# Patient Record
Sex: MALE | Race: WHITE | NOT HISPANIC OR LATINO | Employment: OTHER | URBAN - METROPOLITAN AREA
[De-identification: names, ages, dates, MRNs, and addresses within clinical notes are randomized per-mention and may not be internally consistent; named-entity substitution may affect disease eponyms.]

---

## 2022-08-19 ENCOUNTER — TELEPHONE (OUTPATIENT)
Dept: OPHTHALMOLOGY | Facility: CLINIC | Age: 87
End: 2022-08-19

## 2022-08-23 ENCOUNTER — TELEPHONE (OUTPATIENT)
Dept: OPHTHALMOLOGY | Facility: CLINIC | Age: 87
End: 2022-08-23

## 2022-08-23 NOTE — TELEPHONE ENCOUNTER
Pt recommended to by friend to see Dr. Chappell for macular degeneration exam    Ok per Dr. Chappell to schedule this Thursday

## 2022-08-25 ENCOUNTER — OFFICE VISIT (OUTPATIENT)
Dept: OPHTHALMOLOGY | Facility: CLINIC | Age: 87
End: 2022-08-25
Attending: OPHTHALMOLOGY
Payer: COMMERCIAL

## 2022-08-25 DIAGNOSIS — H35.3131 EARLY DRY STAGE NONEXUDATIVE AGE-RELATED MACULAR DEGENERATION OF BOTH EYES: ICD-10-CM

## 2022-08-25 DIAGNOSIS — H43.811 PVD (POSTERIOR VITREOUS DETACHMENT), RIGHT: ICD-10-CM

## 2022-08-25 DIAGNOSIS — H04.123 DRY EYE SYNDROME, BILATERAL: ICD-10-CM

## 2022-08-25 DIAGNOSIS — H43.812 PVD (POSTERIOR VITREOUS DETACHMENT), LEFT EYE: ICD-10-CM

## 2022-08-25 DIAGNOSIS — E11.9 TYPE 2 DIABETES MELLITUS WITHOUT COMPLICATION, WITH LONG-TERM CURRENT USE OF INSULIN (H): ICD-10-CM

## 2022-08-25 DIAGNOSIS — Z96.1 PSEUDOPHAKIA: ICD-10-CM

## 2022-08-25 DIAGNOSIS — Z79.4 TYPE 2 DIABETES MELLITUS WITHOUT COMPLICATION, WITH LONG-TERM CURRENT USE OF INSULIN (H): ICD-10-CM

## 2022-08-25 PROCEDURE — 92134 CPTRZ OPH DX IMG PST SGM RTA: CPT | Performed by: OPHTHALMOLOGY

## 2022-08-25 PROCEDURE — 99207 FUNDUS AUTOFLUORESCENCE IMAGE (FAF) OU (BOTH EYES): CPT | Mod: 26 | Performed by: OPHTHALMOLOGY

## 2022-08-25 PROCEDURE — G0463 HOSPITAL OUTPT CLINIC VISIT: HCPCS | Mod: 25

## 2022-08-25 PROCEDURE — 92250 FUNDUS PHOTOGRAPHY W/I&R: CPT | Performed by: OPHTHALMOLOGY

## 2022-08-25 PROCEDURE — 99204 OFFICE O/P NEW MOD 45 MIN: CPT | Mod: 25 | Performed by: OPHTHALMOLOGY

## 2022-08-25 PROCEDURE — 68761 CLOSE TEAR DUCT OPENING: CPT | Performed by: OPHTHALMOLOGY

## 2022-08-25 ASSESSMENT — REFRACTION_WEARINGRX
OS_CYLINDER: +0.50
OD_ADD: +2.75
SPECS_TYPE: BIFOCAL
OS_SPHERE: PLANO
OD_AXIS: 028
OD_SPHERE: -0.75
OD_CYLINDER: +1.00
OS_AXIS: 114
OS_ADD: +2.75

## 2022-08-25 ASSESSMENT — EXTERNAL EXAM - RIGHT EYE: OD_EXAM: NORMAL

## 2022-08-25 ASSESSMENT — VISUAL ACUITY
OD_PH_CC: 20/30
OS_PH_CC: 20/30
METHOD: SNELLEN - LINEAR
OD_CC: 20/40
OD_PH_CC+: -1
OS_PH_CC+: -1
CORRECTION_TYPE: GLASSES
OS_CC+: -2
OS_CC: 20/40

## 2022-08-25 ASSESSMENT — SLIT LAMP EXAM - LIDS
COMMENTS: NORMAL
COMMENTS: NORMAL

## 2022-08-25 ASSESSMENT — TONOMETRY
IOP_METHOD: TONOPEN
OS_IOP_MMHG: 21
OD_IOP_MMHG: 21

## 2022-08-25 ASSESSMENT — CONF VISUAL FIELD
OD_NORMAL: 1
METHOD: COUNTING FINGERS
OS_NORMAL: 1

## 2022-08-25 ASSESSMENT — EXTERNAL EXAM - LEFT EYE: OS_EXAM: NORMAL

## 2022-08-25 NOTE — NURSING NOTE
Chief Complaints and History of Present Illnesses   Patient presents with     Macular Degeneration Evaluation     Chief Complaint(s) and History of Present Illness(es)     Macular Degeneration Evaluation               Comments     Patient states that his vision varies. He states that he can see well without his glasses someday's and someday's he needs his glasses. He states that when he is reading the letter come together and it is hard to read. He states that he has some distortion. No flashes of light. No floaters. He states that when he was in [place] he was diagnosed with a retinal bleed in the right eye. He states that his eyes burn and they are drive. 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     Ocular Meds:systane 5-6 times a day in BE    Lio Do Freeman [facility], August 25, 2022 10:09 AM

## 2022-08-25 NOTE — PROGRESS NOTES
I have confirmed the patient's and reviewed Past Medical History, Past Surgical History, Social History, Family History, Problem List, Medication List and agree with Tech note.    CC: fluctuating vision both eyes     HPI: off and on for several montths     Assessment/plan:   1.  Diabetes mellitus no nonproliferative diabetic retinopathy     Blood glucose control   Last HbA1C is 6.8 in 4/2022   Monitor     2.  Posterior vitreous detachment (PVD) both eyes    Informed patient    Monitor     3.  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  Dry eye syndrome    Artificial tears over the counter    Monitor   Lower lids appear to have been cauterized   Silicone punctal plugs 0.6mm bilateral upper lids today    5.  Epiretinal membrane right eye    Informed patient    Monitor     RTC 8 months with Exam/OCT both eyes     Jovanna Mendez MD PhD.  Professor & Chair

## 2024-01-01 ENCOUNTER — APPOINTMENT (OUTPATIENT)
Dept: CT IMAGING | Facility: HOSPITAL | Age: 89
End: 2024-01-01
Attending: STUDENT IN AN ORGANIZED HEALTH CARE EDUCATION/TRAINING PROGRAM
Payer: COMMERCIAL

## 2024-01-01 ENCOUNTER — APPOINTMENT (OUTPATIENT)
Dept: GENERAL RADIOLOGY | Facility: CLINIC | Age: 89
DRG: 023 | End: 2024-01-01
Attending: NURSE PRACTITIONER
Payer: COMMERCIAL

## 2024-01-01 ENCOUNTER — APPOINTMENT (OUTPATIENT)
Dept: CARDIOLOGY | Facility: CLINIC | Age: 89
DRG: 023 | End: 2024-01-01
Attending: NURSE PRACTITIONER
Payer: COMMERCIAL

## 2024-01-01 ENCOUNTER — APPOINTMENT (OUTPATIENT)
Dept: INTERVENTIONAL RADIOLOGY/VASCULAR | Facility: CLINIC | Age: 89
DRG: 023 | End: 2024-01-01
Attending: INTERNAL MEDICINE
Payer: COMMERCIAL

## 2024-01-01 ENCOUNTER — APPOINTMENT (OUTPATIENT)
Dept: RADIOLOGY | Facility: HOSPITAL | Age: 89
End: 2024-01-01
Attending: EMERGENCY MEDICINE
Payer: COMMERCIAL

## 2024-01-01 ENCOUNTER — APPOINTMENT (OUTPATIENT)
Dept: GENERAL RADIOLOGY | Facility: CLINIC | Age: 89
DRG: 023 | End: 2024-01-01
Attending: PSYCHIATRY & NEUROLOGY
Payer: COMMERCIAL

## 2024-01-01 ENCOUNTER — HOSPITAL ENCOUNTER (INPATIENT)
Facility: CLINIC | Age: 89
LOS: 1 days | End: 2024-09-22
Attending: PSYCHIATRY & NEUROLOGY | Admitting: INTERNAL MEDICINE
Payer: COMMERCIAL

## 2024-01-01 ENCOUNTER — APPOINTMENT (OUTPATIENT)
Dept: MRI IMAGING | Facility: CLINIC | Age: 89
DRG: 023 | End: 2024-01-01
Attending: PSYCHIATRY & NEUROLOGY
Payer: COMMERCIAL

## 2024-01-01 ENCOUNTER — HOSPITAL ENCOUNTER (INPATIENT)
Facility: CLINIC | Age: 89
LOS: 2 days | Discharge: HOSPICE | DRG: 023 | End: 2024-09-21
Attending: PSYCHIATRY & NEUROLOGY | Admitting: PSYCHIATRY & NEUROLOGY
Payer: COMMERCIAL

## 2024-01-01 ENCOUNTER — APPOINTMENT (OUTPATIENT)
Dept: CT IMAGING | Facility: CLINIC | Age: 89
DRG: 023 | End: 2024-01-01
Attending: STUDENT IN AN ORGANIZED HEALTH CARE EDUCATION/TRAINING PROGRAM
Payer: COMMERCIAL

## 2024-01-01 ENCOUNTER — HOSPITAL ENCOUNTER (OUTPATIENT)
Age: 89
End: 2024-01-01
Attending: RADIOLOGY
Payer: COMMERCIAL

## 2024-01-01 ENCOUNTER — HOSPITAL ENCOUNTER (EMERGENCY)
Facility: HOSPITAL | Age: 89
Discharge: ANOTHER HOSPITAL | End: 2024-09-19
Attending: EMERGENCY MEDICINE | Admitting: EMERGENCY MEDICINE
Payer: COMMERCIAL

## 2024-01-01 VITALS
DIASTOLIC BLOOD PRESSURE: 51 MMHG | SYSTOLIC BLOOD PRESSURE: 88 MMHG | BODY MASS INDEX: 26.16 KG/M2 | HEART RATE: 97 BPM | OXYGEN SATURATION: 100 % | WEIGHT: 182.3 LBS | RESPIRATION RATE: 22 BRPM

## 2024-01-01 VITALS
DIASTOLIC BLOOD PRESSURE: 58 MMHG | SYSTOLIC BLOOD PRESSURE: 136 MMHG | HEIGHT: 70 IN | OXYGEN SATURATION: 93 % | WEIGHT: 175.93 LBS | TEMPERATURE: 99.5 F | RESPIRATION RATE: 17 BRPM | HEART RATE: 86 BPM | BODY MASS INDEX: 25.19 KG/M2

## 2024-01-01 VITALS — TEMPERATURE: 101 F

## 2024-01-01 DIAGNOSIS — R47.01 APHASIA: ICD-10-CM

## 2024-01-01 DIAGNOSIS — G81.91 RIGHT HEMIPLEGIA (H): ICD-10-CM

## 2024-01-01 DIAGNOSIS — I63.9 ACUTE CVA (CEREBROVASCULAR ACCIDENT) (H): ICD-10-CM

## 2024-01-01 LAB
ANION GAP SERPL CALCULATED.3IONS-SCNC: 10 MMOL/L (ref 7–15)
ANION GAP SERPL CALCULATED.3IONS-SCNC: 10 MMOL/L (ref 7–15)
ANION GAP SERPL CALCULATED.3IONS-SCNC: 12 MMOL/L (ref 7–15)
ANION GAP SERPL CALCULATED.3IONS-SCNC: 12 MMOL/L (ref 7–15)
APTT PPP: 26 SECONDS (ref 22–38)
APTT PPP: 31 SECONDS (ref 22–38)
BASE EXCESS BLDV CALC-SCNC: -1.8 MMOL/L (ref -3–3)
BASOPHILS # BLD AUTO: 0.1 10E3/UL (ref 0–0.2)
BASOPHILS NFR BLD AUTO: 1 %
BUN SERPL-MCNC: 14.8 MG/DL (ref 8–23)
BUN SERPL-MCNC: 15.5 MG/DL (ref 8–23)
BUN SERPL-MCNC: 20.1 MG/DL (ref 8–23)
BUN SERPL-MCNC: 21.7 MG/DL (ref 8–23)
CALCIUM SERPL-MCNC: 8.4 MG/DL (ref 8.8–10.4)
CALCIUM SERPL-MCNC: 8.4 MG/DL (ref 8.8–10.4)
CALCIUM SERPL-MCNC: 8.5 MG/DL (ref 8.8–10.4)
CALCIUM SERPL-MCNC: 9.1 MG/DL (ref 8.8–10.4)
CHLORIDE SERPL-SCNC: 100 MMOL/L (ref 98–107)
CHLORIDE SERPL-SCNC: 101 MMOL/L (ref 98–107)
CHLORIDE SERPL-SCNC: 103 MMOL/L (ref 98–107)
CHLORIDE SERPL-SCNC: 106 MMOL/L (ref 98–107)
CHOLEST SERPL-MCNC: 100 MG/DL
CREAT SERPL-MCNC: 0.91 MG/DL (ref 0.67–1.17)
CREAT SERPL-MCNC: 1.01 MG/DL (ref 0.67–1.17)
CREAT SERPL-MCNC: 1.01 MG/DL (ref 0.67–1.17)
CREAT SERPL-MCNC: 1.17 MG/DL (ref 0.67–1.17)
EGFRCR SERPLBLD CKD-EPI 2021: 60 ML/MIN/1.73M2
EGFRCR SERPLBLD CKD-EPI 2021: 72 ML/MIN/1.73M2
EGFRCR SERPLBLD CKD-EPI 2021: 72 ML/MIN/1.73M2
EGFRCR SERPLBLD CKD-EPI 2021: 81 ML/MIN/1.73M2
EOSINOPHIL # BLD AUTO: 0.3 10E3/UL (ref 0–0.7)
EOSINOPHIL NFR BLD AUTO: 4 %
ERYTHROCYTE [DISTWIDTH] IN BLOOD BY AUTOMATED COUNT: 16 % (ref 10–15)
ERYTHROCYTE [DISTWIDTH] IN BLOOD BY AUTOMATED COUNT: 16.3 % (ref 10–15)
ERYTHROCYTE [DISTWIDTH] IN BLOOD BY AUTOMATED COUNT: 16.5 % (ref 10–15)
EST. AVERAGE GLUCOSE BLD GHB EST-MCNC: 189 MG/DL
GLUCOSE BLDC GLUCOMTR-MCNC: 101 MG/DL (ref 70–99)
GLUCOSE BLDC GLUCOMTR-MCNC: 120 MG/DL (ref 70–99)
GLUCOSE BLDC GLUCOMTR-MCNC: 128 MG/DL (ref 70–99)
GLUCOSE BLDC GLUCOMTR-MCNC: 129 MG/DL (ref 70–99)
GLUCOSE BLDC GLUCOMTR-MCNC: 142 MG/DL (ref 70–99)
GLUCOSE BLDC GLUCOMTR-MCNC: 149 MG/DL (ref 70–99)
GLUCOSE BLDC GLUCOMTR-MCNC: 162 MG/DL (ref 70–99)
GLUCOSE BLDC GLUCOMTR-MCNC: 180 MG/DL (ref 70–99)
GLUCOSE BLDC GLUCOMTR-MCNC: 189 MG/DL (ref 70–99)
GLUCOSE BLDC GLUCOMTR-MCNC: 192 MG/DL (ref 70–99)
GLUCOSE BLDC GLUCOMTR-MCNC: 231 MG/DL (ref 70–99)
GLUCOSE BLDC GLUCOMTR-MCNC: 52 MG/DL (ref 70–99)
GLUCOSE BLDC GLUCOMTR-MCNC: 63 MG/DL (ref 70–99)
GLUCOSE BLDC GLUCOMTR-MCNC: 65 MG/DL (ref 70–99)
GLUCOSE BLDC GLUCOMTR-MCNC: 95 MG/DL (ref 70–99)
GLUCOSE SERPL-MCNC: 111 MG/DL (ref 70–99)
GLUCOSE SERPL-MCNC: 169 MG/DL (ref 70–99)
GLUCOSE SERPL-MCNC: 198 MG/DL (ref 70–99)
GLUCOSE SERPL-MCNC: 84 MG/DL (ref 70–99)
HBA1C MFR BLD: 8.2 %
HCO3 BLDV-SCNC: 24 MMOL/L (ref 21–28)
HCO3 SERPL-SCNC: 20 MMOL/L (ref 22–29)
HCO3 SERPL-SCNC: 20 MMOL/L (ref 22–29)
HCO3 SERPL-SCNC: 22 MMOL/L (ref 22–29)
HCO3 SERPL-SCNC: 23 MMOL/L (ref 22–29)
HCT VFR BLD AUTO: 30.3 % (ref 40–53)
HCT VFR BLD AUTO: 31.6 % (ref 40–53)
HCT VFR BLD AUTO: 31.7 % (ref 40–53)
HDLC SERPL-MCNC: 40 MG/DL
HGB BLD-MCNC: 10.1 G/DL (ref 13.3–17.7)
HGB BLD-MCNC: 9.5 G/DL (ref 13.3–17.7)
HGB BLD-MCNC: 9.9 G/DL (ref 13.3–17.7)
HOLD SPECIMEN: NORMAL
IMM GRANULOCYTES # BLD: 0 10E3/UL
IMM GRANULOCYTES NFR BLD: 1 %
INR PPP: 1.1 (ref 0.85–1.15)
INR PPP: 1.2 (ref 0.85–1.15)
LDLC SERPL CALC-MCNC: 38 MG/DL
LVEF ECHO: NORMAL
LYMPHOCYTES # BLD AUTO: 1.5 10E3/UL (ref 0.8–5.3)
LYMPHOCYTES NFR BLD AUTO: 23 %
MAGNESIUM SERPL-MCNC: 1.6 MG/DL (ref 1.7–2.3)
MAGNESIUM SERPL-MCNC: 2 MG/DL (ref 1.7–2.3)
MCH RBC QN AUTO: 23.7 PG (ref 26.5–33)
MCH RBC QN AUTO: 23.7 PG (ref 26.5–33)
MCH RBC QN AUTO: 23.9 PG (ref 26.5–33)
MCHC RBC AUTO-ENTMCNC: 31.3 G/DL (ref 31.5–36.5)
MCHC RBC AUTO-ENTMCNC: 31.4 G/DL (ref 31.5–36.5)
MCHC RBC AUTO-ENTMCNC: 31.9 G/DL (ref 31.5–36.5)
MCV RBC AUTO: 74 FL (ref 78–100)
MCV RBC AUTO: 76 FL (ref 78–100)
MCV RBC AUTO: 76 FL (ref 78–100)
MONOCYTES # BLD AUTO: 0.8 10E3/UL (ref 0–1.3)
MONOCYTES NFR BLD AUTO: 12 %
NEUTROPHILS # BLD AUTO: 3.9 10E3/UL (ref 1.6–8.3)
NEUTROPHILS NFR BLD AUTO: 60 %
NONHDLC SERPL-MCNC: 60 MG/DL
NRBC # BLD AUTO: 0 10E3/UL
NRBC BLD AUTO-RTO: 0 /100
O2/TOTAL GAS SETTING VFR VENT: 30 %
OXYHGB MFR BLDV: 65 % (ref 70–75)
PCO2 BLDV: 46 MM HG (ref 40–50)
PH BLDV: 7.33 [PH] (ref 7.32–7.43)
PHOSPHATE SERPL-MCNC: 3 MG/DL (ref 2.5–4.5)
PHOSPHATE SERPL-MCNC: 4 MG/DL (ref 2.5–4.5)
PLATELET # BLD AUTO: 296 10E3/UL (ref 150–450)
PLATELET # BLD AUTO: 298 10E3/UL (ref 150–450)
PLATELET # BLD AUTO: 316 10E3/UL (ref 150–450)
PO2 BLDV: 40 MM HG (ref 25–47)
POTASSIUM SERPL-SCNC: 4.2 MMOL/L (ref 3.4–5.3)
POTASSIUM SERPL-SCNC: 4.2 MMOL/L (ref 3.4–5.3)
POTASSIUM SERPL-SCNC: 4.3 MMOL/L (ref 3.4–5.3)
POTASSIUM SERPL-SCNC: 5.5 MMOL/L (ref 3.4–5.3)
RBC # BLD AUTO: 3.97 10E6/UL (ref 4.4–5.9)
RBC # BLD AUTO: 4.18 10E6/UL (ref 4.4–5.9)
RBC # BLD AUTO: 4.26 10E6/UL (ref 4.4–5.9)
SAO2 % BLDV: 66.7 % (ref 70–75)
SODIUM SERPL-SCNC: 133 MMOL/L (ref 135–145)
SODIUM SERPL-SCNC: 135 MMOL/L (ref 135–145)
SODIUM SERPL-SCNC: 135 MMOL/L (ref 135–145)
SODIUM SERPL-SCNC: 136 MMOL/L (ref 135–145)
TRIGL SERPL-MCNC: 112 MG/DL
TROPONIN T SERPL HS-MCNC: 20 NG/L
TROPONIN T SERPL HS-MCNC: 21 NG/L
TROPONIN T SERPL HS-MCNC: 25 NG/L
TSH SERPL DL<=0.005 MIU/L-ACNC: 1.04 UIU/ML (ref 0.3–4.2)
WBC # BLD AUTO: 10.4 10E3/UL (ref 4–11)
WBC # BLD AUTO: 10.8 10E3/UL (ref 4–11)
WBC # BLD AUTO: 6.4 10E3/UL (ref 4–11)

## 2024-01-01 PROCEDURE — 99291 CRITICAL CARE FIRST HOUR: CPT | Mod: FS | Performed by: NURSE PRACTITIONER

## 2024-01-01 PROCEDURE — 999N000185 HC STATISTIC TRANSPORT TIME EA 15 MIN

## 2024-01-01 PROCEDURE — 250N000011 HC RX IP 250 OP 636: Performed by: HOSPITALIST

## 2024-01-01 PROCEDURE — 84484 ASSAY OF TROPONIN QUANT: CPT | Performed by: NURSE PRACTITIONER

## 2024-01-01 PROCEDURE — 999N000157 HC STATISTIC RCP TIME EA 10 MIN

## 2024-01-01 PROCEDURE — 74018 RADEX ABDOMEN 1 VIEW: CPT

## 2024-01-01 PROCEDURE — 250N000013 HC RX MED GY IP 250 OP 250 PS 637: Performed by: NURSE PRACTITIONER

## 2024-01-01 PROCEDURE — 99222 1ST HOSP IP/OBS MODERATE 55: CPT | Performed by: PHYSICIAN ASSISTANT

## 2024-01-01 PROCEDURE — 272N000116 HC CATH CR1

## 2024-01-01 PROCEDURE — 250N000009 HC RX 250: Performed by: NURSE PRACTITIONER

## 2024-01-01 PROCEDURE — 99292 CRITICAL CARE ADDL 30 MIN: CPT | Mod: FS | Performed by: NURSE PRACTITIONER

## 2024-01-01 PROCEDURE — 5A1945Z RESPIRATORY VENTILATION, 24-96 CONSECUTIVE HOURS: ICD-10-PCS | Performed by: RADIOLOGY

## 2024-01-01 PROCEDURE — 272N000506 HC NEEDLE CR6

## 2024-01-01 PROCEDURE — 80048 BASIC METABOLIC PNL TOTAL CA: CPT | Performed by: NURSE PRACTITIONER

## 2024-01-01 PROCEDURE — 71045 X-RAY EXAM CHEST 1 VIEW: CPT

## 2024-01-01 PROCEDURE — 83735 ASSAY OF MAGNESIUM: CPT | Performed by: NURSE PRACTITIONER

## 2024-01-01 PROCEDURE — 03CG3Z7 EXTIRPATION OF MATTER FROM INTRACRANIAL ARTERY USING STENT RETRIEVER, PERCUTANEOUS APPROACH: ICD-10-PCS | Performed by: RADIOLOGY

## 2024-01-01 PROCEDURE — 250N000013 HC RX MED GY IP 250 OP 250 PS 637: Performed by: HOSPITALIST

## 2024-01-01 PROCEDURE — 255N000002 HC RX 255 OP 636: Performed by: INTERNAL MEDICINE

## 2024-01-01 PROCEDURE — 61645 PERQ ART M-THROMBECT &/NFS: CPT

## 2024-01-01 PROCEDURE — 87040 BLOOD CULTURE FOR BACTERIA: CPT | Performed by: PSYCHIATRY & NEUROLOGY

## 2024-01-01 PROCEDURE — 250N000011 HC RX IP 250 OP 636: Performed by: PEDIATRICS

## 2024-01-01 PROCEDURE — 74018 RADEX ABDOMEN 1 VIEW: CPT | Mod: 26 | Performed by: RADIOLOGY

## 2024-01-01 PROCEDURE — 99291 CRITICAL CARE FIRST HOUR: CPT | Mod: 25

## 2024-01-01 PROCEDURE — 61645 PERQ ART M-THROMBECT &/NFS: CPT | Mod: LT | Performed by: RADIOLOGY

## 2024-01-01 PROCEDURE — 70551 MRI BRAIN STEM W/O DYE: CPT | Mod: 26 | Performed by: RADIOLOGY

## 2024-01-01 PROCEDURE — 272N000190 HC ACCESSORY CR14

## 2024-01-01 PROCEDURE — 85610 PROTHROMBIN TIME: CPT | Performed by: NURSE PRACTITIONER

## 2024-01-01 PROCEDURE — 250N000011 HC RX IP 250 OP 636: Mod: JW | Performed by: NURSE PRACTITIONER

## 2024-01-01 PROCEDURE — 85610 PROTHROMBIN TIME: CPT | Performed by: EMERGENCY MEDICINE

## 2024-01-01 PROCEDURE — 250N000009 HC RX 250: Performed by: EMERGENCY MEDICINE

## 2024-01-01 PROCEDURE — 255N000002 HC RX 255 OP 636: Performed by: RADIOLOGY

## 2024-01-01 PROCEDURE — 200N000002 HC R&B ICU UMMC

## 2024-01-01 PROCEDURE — 272N000566 HC SHEATH CR3

## 2024-01-01 PROCEDURE — 250N000009 HC RX 250: Performed by: PHYSICIAN ASSISTANT

## 2024-01-01 PROCEDURE — 96375 TX/PRO/DX INJ NEW DRUG ADDON: CPT | Mod: 59

## 2024-01-01 PROCEDURE — 250N000011 HC RX IP 250 OP 636

## 2024-01-01 PROCEDURE — 250N000013 HC RX MED GY IP 250 OP 250 PS 637: Performed by: PSYCHIATRY & NEUROLOGY

## 2024-01-01 PROCEDURE — C1760 CLOSURE DEV, VASC: HCPCS

## 2024-01-01 PROCEDURE — 36415 COLL VENOUS BLD VENIPUNCTURE: CPT | Performed by: NURSE PRACTITIONER

## 2024-01-01 PROCEDURE — 70496 CT ANGIOGRAPHY HEAD: CPT

## 2024-01-01 PROCEDURE — 999N000065 XR CHEST PORT 1 VIEW

## 2024-01-01 PROCEDURE — 250N000011 HC RX IP 250 OP 636: Performed by: NURSE PRACTITIONER

## 2024-01-01 PROCEDURE — 85730 THROMBOPLASTIN TIME PARTIAL: CPT | Performed by: EMERGENCY MEDICINE

## 2024-01-01 PROCEDURE — 999N000128 HC STATISTIC PERIPHERAL IV START W/O US GUIDANCE

## 2024-01-01 PROCEDURE — C1757 CATH, THROMBECTOMY/EMBOLECT: HCPCS

## 2024-01-01 PROCEDURE — 82805 BLOOD GASES W/O2 SATURATION: CPT | Performed by: NURSE PRACTITIONER

## 2024-01-01 PROCEDURE — 94002 VENT MGMT INPAT INIT DAY: CPT

## 2024-01-01 PROCEDURE — 258N000001 HC RX 258: Performed by: NURSE PRACTITIONER

## 2024-01-01 PROCEDURE — 250N000011 HC RX IP 250 OP 636: Performed by: PHYSICIAN ASSISTANT

## 2024-01-01 PROCEDURE — 999N000065 XR ABDOMEN PORT 1 VIEW

## 2024-01-01 PROCEDURE — 99152 MOD SED SAME PHYS/QHP 5/>YRS: CPT

## 2024-01-01 PROCEDURE — 85730 THROMBOPLASTIN TIME PARTIAL: CPT | Performed by: NURSE PRACTITIONER

## 2024-01-01 PROCEDURE — 80048 BASIC METABOLIC PNL TOTAL CA: CPT | Performed by: EMERGENCY MEDICINE

## 2024-01-01 PROCEDURE — 99292 CRITICAL CARE ADDL 30 MIN: CPT

## 2024-01-01 PROCEDURE — 70450 CT HEAD/BRAIN W/O DYE: CPT | Mod: 26 | Performed by: RADIOLOGY

## 2024-01-01 PROCEDURE — 71045 X-RAY EXAM CHEST 1 VIEW: CPT | Mod: 26 | Performed by: RADIOLOGY

## 2024-01-01 PROCEDURE — 250N000011 HC RX IP 250 OP 636: Performed by: EMERGENCY MEDICINE

## 2024-01-01 PROCEDURE — 84100 ASSAY OF PHOSPHORUS: CPT | Performed by: NURSE PRACTITIONER

## 2024-01-01 PROCEDURE — 94003 VENT MGMT INPAT SUBQ DAY: CPT

## 2024-01-01 PROCEDURE — 85027 COMPLETE CBC AUTOMATED: CPT | Performed by: NURSE PRACTITIONER

## 2024-01-01 PROCEDURE — 258N000003 HC RX IP 258 OP 636

## 2024-01-01 PROCEDURE — 80061 LIPID PANEL: CPT | Performed by: NURSE PRACTITIONER

## 2024-01-01 PROCEDURE — 85025 COMPLETE CBC W/AUTO DIFF WBC: CPT | Performed by: EMERGENCY MEDICINE

## 2024-01-01 PROCEDURE — 258N000003 HC RX IP 258 OP 636: Performed by: INTERNAL MEDICINE

## 2024-01-01 PROCEDURE — C1887 CATHETER, GUIDING: HCPCS

## 2024-01-01 PROCEDURE — 70551 MRI BRAIN STEM W/O DYE: CPT

## 2024-01-01 PROCEDURE — 93005 ELECTROCARDIOGRAM TRACING: CPT | Performed by: EMERGENCY MEDICINE

## 2024-01-01 PROCEDURE — 250N000009 HC RX 250: Performed by: INTERNAL MEDICINE

## 2024-01-01 PROCEDURE — 250N000011 HC RX IP 250 OP 636: Performed by: INTERNAL MEDICINE

## 2024-01-01 PROCEDURE — G0508 CRIT CARE TELEHEA CONSULT 60: HCPCS | Mod: G0

## 2024-01-01 PROCEDURE — 83036 HEMOGLOBIN GLYCOSYLATED A1C: CPT | Performed by: NURSE PRACTITIONER

## 2024-01-01 PROCEDURE — 99223 1ST HOSP IP/OBS HIGH 75: CPT | Performed by: NURSE PRACTITIONER

## 2024-01-01 PROCEDURE — 110N000005 HC R&B HOSPICE, ACCENT

## 2024-01-01 PROCEDURE — 999N000208 ECHOCARDIOGRAM COMPLETE

## 2024-01-01 PROCEDURE — C1769 GUIDE WIRE: HCPCS

## 2024-01-01 PROCEDURE — 250N000012 HC RX MED GY IP 250 OP 636 PS 637: Performed by: NURSE PRACTITIONER

## 2024-01-01 PROCEDURE — 37195 THROMBOLYTIC THERAPY STROKE: CPT

## 2024-01-01 PROCEDURE — 36415 COLL VENOUS BLD VENIPUNCTURE: CPT | Performed by: PSYCHIATRY & NEUROLOGY

## 2024-01-01 PROCEDURE — 250N000009 HC RX 250: Performed by: PSYCHIATRY & NEUROLOGY

## 2024-01-01 PROCEDURE — 99222 1ST HOSP IP/OBS MODERATE 55: CPT | Performed by: INTERNAL MEDICINE

## 2024-01-01 PROCEDURE — 84443 ASSAY THYROID STIM HORMONE: CPT | Performed by: NURSE PRACTITIONER

## 2024-01-01 PROCEDURE — 76937 US GUIDE VASCULAR ACCESS: CPT

## 2024-01-01 PROCEDURE — 99239 HOSP IP/OBS DSCHRG MGMT >30: CPT | Performed by: HOSPITALIST

## 2024-01-01 PROCEDURE — 93306 TTE W/DOPPLER COMPLETE: CPT | Mod: 26 | Performed by: INTERNAL MEDICINE

## 2024-01-01 PROCEDURE — 84484 ASSAY OF TROPONIN QUANT: CPT | Performed by: EMERGENCY MEDICINE

## 2024-01-01 PROCEDURE — 250N000009 HC RX 250: Performed by: RADIOLOGY

## 2024-01-01 PROCEDURE — 250N000011 HC RX IP 250 OP 636: Performed by: PSYCHIATRY & NEUROLOGY

## 2024-01-01 PROCEDURE — 44500 INTRO GASTROINTESTINAL TUBE: CPT

## 2024-01-01 PROCEDURE — 999N000104 HC STATISTIC NO CHARGE

## 2024-01-01 PROCEDURE — 250N000013 HC RX MED GY IP 250 OP 250 PS 637

## 2024-01-01 PROCEDURE — 96365 THER/PROPH/DIAG IV INF INIT: CPT | Mod: 59

## 2024-01-01 PROCEDURE — 99239 HOSP IP/OBS DSCHRG MGMT >30: CPT | Mod: FS | Performed by: NURSE PRACTITIONER

## 2024-01-01 PROCEDURE — 36415 COLL VENOUS BLD VENIPUNCTURE: CPT | Performed by: EMERGENCY MEDICINE

## 2024-01-01 PROCEDURE — 999N000253 HC STATISTIC WEANING TRIALS

## 2024-01-01 PROCEDURE — 999N000259 HC STATISTIC EXTUBATION

## 2024-01-01 PROCEDURE — 31500 INSERT EMERGENCY AIRWAY: CPT

## 2024-01-01 PROCEDURE — 70450 CT HEAD/BRAIN W/O DYE: CPT

## 2024-01-01 RX ORDER — ACETAMINOPHEN 325 MG/1
650 TABLET ORAL EVERY 4 HOURS PRN
Status: DISCONTINUED | OUTPATIENT
Start: 2024-01-01 | End: 2024-01-01

## 2024-01-01 RX ORDER — MORPHINE SULFATE 10 MG/5ML
10 SOLUTION ORAL
Status: DISCONTINUED | OUTPATIENT
Start: 2024-01-01 | End: 2024-01-01

## 2024-01-01 RX ORDER — ETOMIDATE 2 MG/ML
20 INJECTION INTRAVENOUS ONCE
Status: COMPLETED | OUTPATIENT
Start: 2024-01-01 | End: 2024-01-01

## 2024-01-01 RX ORDER — MORPHINE SULFATE 2 MG/ML
2 INJECTION, SOLUTION INTRAMUSCULAR; INTRAVENOUS
Status: DISCONTINUED | OUTPATIENT
Start: 2024-01-01 | End: 2024-01-01 | Stop reason: HOSPADM

## 2024-01-01 RX ORDER — NOREPINEPHRINE BITARTRATE 0.06 MG/ML
.01-.6 INJECTION, SOLUTION INTRAVENOUS CONTINUOUS
Status: DISCONTINUED | OUTPATIENT
Start: 2024-01-01 | End: 2024-01-01

## 2024-01-01 RX ORDER — LORAZEPAM 1 MG/1
1 TABLET ORAL EVERY 6 HOURS
Status: CANCELLED | OUTPATIENT
Start: 2024-01-01

## 2024-01-01 RX ORDER — SENNOSIDES 8.6 MG
1 TABLET ORAL 2 TIMES DAILY PRN
Status: DISCONTINUED | OUTPATIENT
Start: 2024-01-01 | End: 2024-01-01

## 2024-01-01 RX ORDER — ONDANSETRON 2 MG/ML
4 INJECTION INTRAMUSCULAR; INTRAVENOUS EVERY 6 HOURS PRN
Status: CANCELLED | OUTPATIENT
Start: 2024-01-01

## 2024-01-01 RX ORDER — SODIUM CHLORIDE 9 MG/ML
INJECTION, SOLUTION INTRAVENOUS CONTINUOUS
Status: DISCONTINUED | OUTPATIENT
Start: 2024-01-01 | End: 2024-01-01 | Stop reason: HOSPADM

## 2024-01-01 RX ORDER — CARBOXYMETHYLCELLULOSE SODIUM 5 MG/ML
1-2 SOLUTION/ DROPS OPHTHALMIC
Status: DISCONTINUED | OUTPATIENT
Start: 2024-01-01 | End: 2024-01-01 | Stop reason: HOSPADM

## 2024-01-01 RX ORDER — MORPHINE SULFATE 10 MG/5ML
5 SOLUTION ORAL
Status: DISCONTINUED | OUTPATIENT
Start: 2024-01-01 | End: 2024-01-01

## 2024-01-01 RX ORDER — MINERAL OIL/HYDROPHIL PETROLAT
OINTMENT (GRAM) TOPICAL
Status: DISCONTINUED | OUTPATIENT
Start: 2024-01-01 | End: 2024-01-01

## 2024-01-01 RX ORDER — GLYCOPYRROLATE 0.2 MG/ML
0.4 INJECTION, SOLUTION INTRAMUSCULAR; INTRAVENOUS EVERY 4 HOURS
Status: DISCONTINUED | OUTPATIENT
Start: 2024-01-01 | End: 2024-01-01 | Stop reason: HOSPADM

## 2024-01-01 RX ORDER — NALOXONE HYDROCHLORIDE 0.4 MG/ML
0.1 INJECTION, SOLUTION INTRAMUSCULAR; INTRAVENOUS; SUBCUTANEOUS
Status: DISCONTINUED | OUTPATIENT
Start: 2024-01-01 | End: 2024-01-01

## 2024-01-01 RX ORDER — LORAZEPAM 1 MG/1
1 TABLET ORAL
Status: DISCONTINUED | OUTPATIENT
Start: 2024-01-01 | End: 2024-01-01

## 2024-01-01 RX ORDER — NALOXONE HYDROCHLORIDE 0.4 MG/ML
0.2 INJECTION, SOLUTION INTRAMUSCULAR; INTRAVENOUS; SUBCUTANEOUS
Status: DISCONTINUED | OUTPATIENT
Start: 2024-01-01 | End: 2024-01-01 | Stop reason: HOSPADM

## 2024-01-01 RX ORDER — FENTANYL CITRATE-0.9 % NACL/PF 10 MCG/ML
PLASTIC BAG, INJECTION (ML) INTRAVENOUS
Status: COMPLETED
Start: 2024-01-01 | End: 2024-01-01

## 2024-01-01 RX ORDER — ALLOPURINOL 100 MG/1
100 TABLET ORAL DAILY
Status: DISCONTINUED | OUTPATIENT
Start: 2024-01-01 | End: 2024-01-01

## 2024-01-01 RX ORDER — LORAZEPAM 2 MG/ML
1 INJECTION INTRAMUSCULAR EVERY 6 HOURS
Status: DISCONTINUED | OUTPATIENT
Start: 2024-01-01 | End: 2024-01-01 | Stop reason: HOSPADM

## 2024-01-01 RX ORDER — DEXTROSE MONOHYDRATE 100 MG/ML
INJECTION, SOLUTION INTRAVENOUS CONTINUOUS PRN
Status: DISCONTINUED | OUTPATIENT
Start: 2024-01-01 | End: 2024-01-01

## 2024-01-01 RX ORDER — ASPIRIN 81 MG/1
81 TABLET, CHEWABLE ORAL DAILY
Status: DISCONTINUED | OUTPATIENT
Start: 2024-01-01 | End: 2024-01-01

## 2024-01-01 RX ORDER — AMOXICILLIN 250 MG
1-2 CAPSULE ORAL 2 TIMES DAILY
Status: DISCONTINUED | OUTPATIENT
Start: 2024-01-01 | End: 2024-01-01

## 2024-01-01 RX ORDER — LABETALOL HYDROCHLORIDE 5 MG/ML
20 INJECTION, SOLUTION INTRAVENOUS ONCE
Status: DISCONTINUED | OUTPATIENT
Start: 2024-01-01 | End: 2024-01-01 | Stop reason: HOSPADM

## 2024-01-01 RX ORDER — MORPHINE SULFATE 2 MG/ML
2 INJECTION, SOLUTION INTRAMUSCULAR; INTRAVENOUS EVERY 4 HOURS
Status: DISCONTINUED | OUTPATIENT
Start: 2024-01-01 | End: 2024-01-01

## 2024-01-01 RX ORDER — LORAZEPAM 2 MG/ML
1 INJECTION INTRAMUSCULAR
Status: DISCONTINUED | OUTPATIENT
Start: 2024-01-01 | End: 2024-01-01

## 2024-01-01 RX ORDER — ASPIRIN 325 MG
325 TABLET, DELAYED RELEASE (ENTERIC COATED) ORAL DAILY
COMMUNITY

## 2024-01-01 RX ORDER — NALOXONE HYDROCHLORIDE 0.4 MG/ML
0.2 INJECTION, SOLUTION INTRAMUSCULAR; INTRAVENOUS; SUBCUTANEOUS
Status: DISCONTINUED | OUTPATIENT
Start: 2024-01-01 | End: 2024-01-01

## 2024-01-01 RX ORDER — MORPHINE SULFATE 2 MG/ML
1 INJECTION, SOLUTION INTRAMUSCULAR; INTRAVENOUS
Status: DISCONTINUED | OUTPATIENT
Start: 2024-01-01 | End: 2024-01-01 | Stop reason: HOSPADM

## 2024-01-01 RX ORDER — METFORMIN HCL 500 MG
500 TABLET, EXTENDED RELEASE 24 HR ORAL 2 TIMES DAILY WITH MEALS
COMMUNITY

## 2024-01-01 RX ORDER — LOPERAMIDE HCL 2 MG
2 CAPSULE ORAL DAILY PRN
COMMUNITY

## 2024-01-01 RX ORDER — LIDOCAINE 40 MG/G
CREAM TOPICAL
Status: DISCONTINUED | OUTPATIENT
Start: 2024-01-01 | End: 2024-01-01

## 2024-01-01 RX ORDER — ATROPINE SULFATE 0.1 MG/ML
INJECTION INTRAVENOUS
Status: DISPENSED
Start: 2024-01-01 | End: 2024-01-01

## 2024-01-01 RX ORDER — NALOXONE HYDROCHLORIDE 0.4 MG/ML
0.1 INJECTION, SOLUTION INTRAMUSCULAR; INTRAVENOUS; SUBCUTANEOUS
Status: DISCONTINUED | OUTPATIENT
Start: 2024-01-01 | End: 2024-01-01 | Stop reason: HOSPADM

## 2024-01-01 RX ORDER — LISINOPRIL 20 MG/1
20 TABLET ORAL DAILY
Status: DISCONTINUED | OUTPATIENT
Start: 2024-01-01 | End: 2024-01-01

## 2024-01-01 RX ORDER — MORPHINE SULFATE 2 MG/ML
2 INJECTION, SOLUTION INTRAMUSCULAR; INTRAVENOUS EVERY 4 HOURS
Status: CANCELLED | OUTPATIENT
Start: 2024-01-01

## 2024-01-01 RX ORDER — FENTANYL CITRATE 50 UG/ML
25-50 INJECTION, SOLUTION INTRAMUSCULAR; INTRAVENOUS EVERY 5 MIN PRN
Status: DISCONTINUED | OUTPATIENT
Start: 2024-01-01 | End: 2024-01-01 | Stop reason: HOSPADM

## 2024-01-01 RX ORDER — ATORVASTATIN CALCIUM 40 MG/1
40 TABLET, FILM COATED ORAL DAILY
COMMUNITY

## 2024-01-01 RX ORDER — LORAZEPAM 1 MG/1
1 TABLET ORAL EVERY 6 HOURS
Status: DISCONTINUED | OUTPATIENT
Start: 2024-01-01 | End: 2024-01-01 | Stop reason: HOSPADM

## 2024-01-01 RX ORDER — CARBOXYMETHYLCELLULOSE SODIUM 5 MG/ML
1-2 SOLUTION/ DROPS OPHTHALMIC
Status: CANCELLED | OUTPATIENT
Start: 2024-01-01

## 2024-01-01 RX ORDER — LIDOCAINE 40 MG/G
CREAM TOPICAL
Status: CANCELLED | OUTPATIENT
Start: 2024-01-01

## 2024-01-01 RX ORDER — SENNOSIDES 8.6 MG
1 TABLET ORAL 2 TIMES DAILY PRN
Status: CANCELLED | OUTPATIENT
Start: 2024-01-01

## 2024-01-01 RX ORDER — METOCLOPRAMIDE HYDROCHLORIDE 5 MG/ML
10 INJECTION INTRAMUSCULAR; INTRAVENOUS ONCE
Status: COMPLETED | OUTPATIENT
Start: 2024-01-01 | End: 2024-01-01

## 2024-01-01 RX ORDER — NALOXONE HYDROCHLORIDE 0.4 MG/ML
0.4 INJECTION, SOLUTION INTRAMUSCULAR; INTRAVENOUS; SUBCUTANEOUS
Status: DISCONTINUED | OUTPATIENT
Start: 2024-01-01 | End: 2024-01-01 | Stop reason: HOSPADM

## 2024-01-01 RX ORDER — ATROPINE SULFATE 10 MG/ML
2 SOLUTION/ DROPS OPHTHALMIC EVERY 4 HOURS PRN
Status: CANCELLED | OUTPATIENT
Start: 2024-01-01

## 2024-01-01 RX ORDER — NICOTINE POLACRILEX 4 MG
15-30 LOZENGE BUCCAL
Status: CANCELLED | OUTPATIENT
Start: 2024-01-01

## 2024-01-01 RX ORDER — MORPHINE SULFATE 2 MG/ML
2 INJECTION, SOLUTION INTRAMUSCULAR; INTRAVENOUS
Status: DISCONTINUED | OUTPATIENT
Start: 2024-01-01 | End: 2024-01-01

## 2024-01-01 RX ORDER — SODIUM CHLORIDE, SODIUM GLUCONATE, SODIUM ACETATE, POTASSIUM CHLORIDE AND MAGNESIUM CHLORIDE 526; 502; 368; 37; 30 MG/100ML; MG/100ML; MG/100ML; MG/100ML; MG/100ML
INJECTION, SOLUTION INTRAVENOUS CONTINUOUS
Status: DISCONTINUED | OUTPATIENT
Start: 2024-01-01 | End: 2024-01-01

## 2024-01-01 RX ORDER — POLYETHYLENE GLYCOL 3350 17 G/17G
17 POWDER, FOR SOLUTION ORAL DAILY
Status: CANCELLED | OUTPATIENT
Start: 2024-01-01

## 2024-01-01 RX ORDER — NALOXONE HYDROCHLORIDE 0.4 MG/ML
0.4 INJECTION, SOLUTION INTRAMUSCULAR; INTRAVENOUS; SUBCUTANEOUS
Status: DISCONTINUED | OUTPATIENT
Start: 2024-01-01 | End: 2024-01-01

## 2024-01-01 RX ORDER — IOPAMIDOL 755 MG/ML
67 INJECTION, SOLUTION INTRAVASCULAR ONCE
Status: COMPLETED | OUTPATIENT
Start: 2024-01-01 | End: 2024-01-01

## 2024-01-01 RX ORDER — DEXTROSE MONOHYDRATE 25 G/50ML
25-50 INJECTION, SOLUTION INTRAVENOUS
Status: DISCONTINUED | OUTPATIENT
Start: 2024-01-01 | End: 2024-01-01

## 2024-01-01 RX ORDER — SCOLOPAMINE TRANSDERMAL SYSTEM 1 MG/1
1 PATCH, EXTENDED RELEASE TRANSDERMAL
Status: DISCONTINUED | OUTPATIENT
Start: 2024-01-01 | End: 2024-01-01 | Stop reason: HOSPADM

## 2024-01-01 RX ORDER — PROPOFOL 10 MG/ML
5-75 INJECTION, EMULSION INTRAVENOUS CONTINUOUS
Status: DISCONTINUED | OUTPATIENT
Start: 2024-01-01 | End: 2024-01-01

## 2024-01-01 RX ORDER — LORAZEPAM 0.5 MG/1
1 TABLET ORAL EVERY 6 HOURS
Status: DISCONTINUED | OUTPATIENT
Start: 2024-01-01 | End: 2024-01-01

## 2024-01-01 RX ORDER — GLIPIZIDE 10 MG/1
1-2 TABLET ORAL
Status: DISCONTINUED | OUTPATIENT
Start: 2024-01-01 | End: 2024-01-01

## 2024-01-01 RX ORDER — PROPOFOL 10 MG/ML
5-75 INJECTION, EMULSION INTRAVENOUS CONTINUOUS
Status: DISCONTINUED | OUTPATIENT
Start: 2024-01-01 | End: 2024-01-01 | Stop reason: HOSPADM

## 2024-01-01 RX ORDER — FLUMAZENIL 0.1 MG/ML
0.2 INJECTION, SOLUTION INTRAVENOUS
Status: DISCONTINUED | OUTPATIENT
Start: 2024-01-01 | End: 2024-01-01 | Stop reason: HOSPADM

## 2024-01-01 RX ORDER — ATROPINE SULFATE 10 MG/ML
2 SOLUTION/ DROPS OPHTHALMIC EVERY 4 HOURS PRN
Status: DISCONTINUED | OUTPATIENT
Start: 2024-01-01 | End: 2024-01-01 | Stop reason: HOSPADM

## 2024-01-01 RX ORDER — LIDOCAINE 40 MG/G
CREAM TOPICAL
Status: DISCONTINUED | OUTPATIENT
Start: 2024-01-01 | End: 2024-01-01 | Stop reason: HOSPADM

## 2024-01-01 RX ORDER — PROPOFOL 10 MG/ML
5-45 INJECTION, EMULSION INTRAVENOUS CONTINUOUS
Status: DISCONTINUED | OUTPATIENT
Start: 2024-01-01 | End: 2024-01-01

## 2024-01-01 RX ORDER — GLYCOPYRROLATE 0.2 MG/ML
0.4 INJECTION, SOLUTION INTRAMUSCULAR; INTRAVENOUS EVERY 4 HOURS
Status: DISCONTINUED | OUTPATIENT
Start: 2024-01-01 | End: 2024-01-01

## 2024-01-01 RX ORDER — PROPOFOL 10 MG/ML
5-30 INJECTION, EMULSION INTRAVENOUS CONTINUOUS
Status: DISCONTINUED | OUTPATIENT
Start: 2024-01-01 | End: 2024-01-01

## 2024-01-01 RX ORDER — NOREPINEPHRINE BITARTRATE 0.02 MG/ML
.01-.6 INJECTION, SOLUTION INTRAVENOUS CONTINUOUS PRN
Status: DISCONTINUED | OUTPATIENT
Start: 2024-01-01 | End: 2024-01-01

## 2024-01-01 RX ORDER — ONDANSETRON 2 MG/ML
4 INJECTION INTRAMUSCULAR; INTRAVENOUS EVERY 6 HOURS PRN
Status: DISCONTINUED | OUTPATIENT
Start: 2024-01-01 | End: 2024-01-01

## 2024-01-01 RX ORDER — HYDRALAZINE HYDROCHLORIDE 20 MG/ML
10-20 INJECTION INTRAMUSCULAR; INTRAVENOUS EVERY 30 MIN PRN
Status: DISCONTINUED | OUTPATIENT
Start: 2024-01-01 | End: 2024-01-01

## 2024-01-01 RX ORDER — SODIUM CHLORIDE 9 MG/ML
INJECTION, SOLUTION INTRAVENOUS CONTINUOUS PRN
Status: DISCONTINUED | OUTPATIENT
Start: 2024-01-01 | End: 2024-01-01 | Stop reason: HOSPADM

## 2024-01-01 RX ORDER — HYDRALAZINE HYDROCHLORIDE 20 MG/ML
10 INJECTION INTRAMUSCULAR; INTRAVENOUS EVERY 10 MIN PRN
Status: DISCONTINUED | OUTPATIENT
Start: 2024-01-01 | End: 2024-01-01 | Stop reason: HOSPADM

## 2024-01-01 RX ORDER — NALOXONE HYDROCHLORIDE 0.4 MG/ML
0.1 INJECTION, SOLUTION INTRAMUSCULAR; INTRAVENOUS; SUBCUTANEOUS
Status: CANCELLED | OUTPATIENT
Start: 2024-01-01

## 2024-01-01 RX ORDER — FENTANYL CITRATE 50 UG/ML
25 INJECTION, SOLUTION INTRAMUSCULAR; INTRAVENOUS ONCE
Status: DISCONTINUED | OUTPATIENT
Start: 2024-01-01 | End: 2024-01-01

## 2024-01-01 RX ORDER — GLYCOPYRROLATE 1 MG/1
2 TABLET ORAL EVERY 4 HOURS PRN
Status: DISCONTINUED | OUTPATIENT
Start: 2024-01-01 | End: 2024-01-01

## 2024-01-01 RX ORDER — NALOXONE HYDROCHLORIDE 0.4 MG/ML
0.2 INJECTION, SOLUTION INTRAMUSCULAR; INTRAVENOUS; SUBCUTANEOUS
Status: CANCELLED | OUTPATIENT
Start: 2024-01-01

## 2024-01-01 RX ORDER — BISACODYL 10 MG
10 SUPPOSITORY, RECTAL RECTAL
Status: CANCELLED | OUTPATIENT
Start: 2024-09-24

## 2024-01-01 RX ORDER — NALOXONE HYDROCHLORIDE 0.4 MG/ML
0.4 INJECTION, SOLUTION INTRAMUSCULAR; INTRAVENOUS; SUBCUTANEOUS
Status: CANCELLED | OUTPATIENT
Start: 2024-01-01

## 2024-01-01 RX ORDER — AMOXICILLIN 250 MG
1-2 CAPSULE ORAL 2 TIMES DAILY
Status: CANCELLED | OUTPATIENT
Start: 2024-01-01

## 2024-01-01 RX ORDER — MORPHINE SULFATE 2 MG/ML
1-2 INJECTION, SOLUTION INTRAMUSCULAR; INTRAVENOUS
Status: DISCONTINUED | OUTPATIENT
Start: 2024-01-01 | End: 2024-01-01

## 2024-01-01 RX ORDER — SENNOSIDES 8.6 MG
1 TABLET ORAL 2 TIMES DAILY PRN
Status: DISCONTINUED | OUTPATIENT
Start: 2024-01-01 | End: 2024-01-01 | Stop reason: HOSPADM

## 2024-01-01 RX ORDER — GLYCOPYRROLATE 0.2 MG/ML
0.2 INJECTION, SOLUTION INTRAMUSCULAR; INTRAVENOUS EVERY 4 HOURS PRN
Status: DISCONTINUED | OUTPATIENT
Start: 2024-01-01 | End: 2024-01-01

## 2024-01-01 RX ORDER — NICOTINE POLACRILEX 4 MG
15-30 LOZENGE BUCCAL
Status: DISCONTINUED | OUTPATIENT
Start: 2024-01-01 | End: 2024-01-01

## 2024-01-01 RX ORDER — LABETALOL HYDROCHLORIDE 5 MG/ML
10 INJECTION, SOLUTION INTRAVENOUS EVERY 10 MIN PRN
Status: DISCONTINUED | OUTPATIENT
Start: 2024-01-01 | End: 2024-01-01 | Stop reason: HOSPADM

## 2024-01-01 RX ORDER — CEFAZOLIN SODIUM 2 G/100ML
2 INJECTION, SOLUTION INTRAVENOUS EVERY 8 HOURS
Status: DISCONTINUED | OUTPATIENT
Start: 2024-01-01 | End: 2024-01-01

## 2024-01-01 RX ORDER — MORPHINE SULFATE 2 MG/ML
1 INJECTION, SOLUTION INTRAMUSCULAR; INTRAVENOUS
Status: DISCONTINUED | OUTPATIENT
Start: 2024-01-01 | End: 2024-01-01

## 2024-01-01 RX ORDER — GABAPENTIN 100 MG/1
200 CAPSULE ORAL 2 TIMES DAILY
Status: DISCONTINUED | OUTPATIENT
Start: 2024-01-01 | End: 2024-01-01

## 2024-01-01 RX ORDER — FENTANYL CITRATE 50 UG/ML
100 INJECTION, SOLUTION INTRAMUSCULAR; INTRAVENOUS ONCE
Status: COMPLETED | OUTPATIENT
Start: 2024-01-01 | End: 2024-01-01

## 2024-01-01 RX ORDER — GLYCOPYRROLATE 0.2 MG/ML
0.4 INJECTION, SOLUTION INTRAMUSCULAR; INTRAVENOUS EVERY 4 HOURS
Status: CANCELLED | OUTPATIENT
Start: 2024-01-01

## 2024-01-01 RX ORDER — DEXTROSE MONOHYDRATE 25 G/50ML
25-50 INJECTION, SOLUTION INTRAVENOUS
Status: CANCELLED | OUTPATIENT
Start: 2024-01-01

## 2024-01-01 RX ORDER — BISACODYL 10 MG
10 SUPPOSITORY, RECTAL RECTAL
Status: DISCONTINUED | OUTPATIENT
Start: 2024-09-24 | End: 2024-01-01

## 2024-01-01 RX ORDER — LABETALOL HYDROCHLORIDE 5 MG/ML
10-20 INJECTION, SOLUTION INTRAVENOUS
Status: CANCELLED | OUTPATIENT
Start: 2024-01-01

## 2024-01-01 RX ORDER — HEPARIN SODIUM 200 [USP'U]/100ML
1 INJECTION, SOLUTION INTRAVENOUS CONTINUOUS PRN
Status: DISCONTINUED | OUTPATIENT
Start: 2024-01-01 | End: 2024-01-01 | Stop reason: HOSPADM

## 2024-01-01 RX ORDER — LABETALOL HYDROCHLORIDE 5 MG/ML
10-20 INJECTION, SOLUTION INTRAVENOUS
Status: DISCONTINUED | OUTPATIENT
Start: 2024-01-01 | End: 2024-01-01

## 2024-01-01 RX ORDER — GABAPENTIN 100 MG/1
200 CAPSULE ORAL 2 TIMES DAILY
COMMUNITY

## 2024-01-01 RX ORDER — MAGNESIUM SULFATE HEPTAHYDRATE 40 MG/ML
2 INJECTION, SOLUTION INTRAVENOUS ONCE
Status: COMPLETED | OUTPATIENT
Start: 2024-01-01 | End: 2024-01-01

## 2024-01-01 RX ORDER — HALOPERIDOL 5 MG/ML
2 INJECTION INTRAMUSCULAR ONCE
Status: COMPLETED | OUTPATIENT
Start: 2024-01-01 | End: 2024-01-01

## 2024-01-01 RX ORDER — DEXTROSE MONOHYDRATE AND SODIUM CHLORIDE 5; .9 G/100ML; G/100ML
INJECTION, SOLUTION INTRAVENOUS CONTINUOUS
Status: DISCONTINUED | OUTPATIENT
Start: 2024-01-01 | End: 2024-01-01

## 2024-01-01 RX ORDER — BISACODYL 10 MG
10 SUPPOSITORY, RECTAL RECTAL
Status: DISCONTINUED | OUTPATIENT
Start: 2024-09-24 | End: 2024-01-01 | Stop reason: HOSPADM

## 2024-01-01 RX ORDER — LORAZEPAM 2 MG/ML
1 INJECTION INTRAMUSCULAR EVERY 6 HOURS
Status: CANCELLED | OUTPATIENT
Start: 2024-01-01

## 2024-01-01 RX ORDER — FENTANYL CITRATE 50 UG/ML
25-50 INJECTION, SOLUTION INTRAMUSCULAR; INTRAVENOUS EVERY 5 MIN PRN
Status: CANCELLED | OUTPATIENT
Start: 2024-01-01

## 2024-01-01 RX ORDER — ENOXAPARIN SODIUM 100 MG/ML
40 INJECTION SUBCUTANEOUS EVERY 24 HOURS
Status: DISCONTINUED | OUTPATIENT
Start: 2024-01-01 | End: 2024-01-01

## 2024-01-01 RX ORDER — ACETAMINOPHEN 650 MG/1
650 SUPPOSITORY RECTAL EVERY 4 HOURS PRN
Status: DISCONTINUED | OUTPATIENT
Start: 2024-01-01 | End: 2024-01-01

## 2024-01-01 RX ORDER — HEPARIN SODIUM 200 [USP'U]/100ML
1 INJECTION, SOLUTION INTRAVENOUS EVERY 5 MIN PRN
Status: CANCELLED | OUTPATIENT
Start: 2024-01-01

## 2024-01-01 RX ORDER — GUAIFENESIN 600 MG/1
15 TABLET, EXTENDED RELEASE ORAL DAILY
Status: DISCONTINUED | OUTPATIENT
Start: 2024-01-01 | End: 2024-01-01

## 2024-01-01 RX ORDER — ACETAMINOPHEN 650 MG/1
650 SUPPOSITORY RECTAL EVERY 6 HOURS PRN
Status: DISCONTINUED | OUTPATIENT
Start: 2024-01-01 | End: 2024-01-01 | Stop reason: HOSPADM

## 2024-01-01 RX ORDER — FLUMAZENIL 0.1 MG/ML
0.2 INJECTION, SOLUTION INTRAVENOUS
Status: CANCELLED | OUTPATIENT
Start: 2024-01-01

## 2024-01-01 RX ORDER — SODIUM CHLORIDE, SODIUM GLUCONATE, SODIUM ACETATE, POTASSIUM CHLORIDE AND MAGNESIUM CHLORIDE 526; 502; 368; 37; 30 MG/100ML; MG/100ML; MG/100ML; MG/100ML; MG/100ML
INJECTION, SOLUTION INTRAVENOUS CONTINUOUS
Status: CANCELLED | OUTPATIENT
Start: 2024-01-01

## 2024-01-01 RX ORDER — ACETAMINOPHEN 650 MG/1
650 SUPPOSITORY RECTAL EVERY 4 HOURS PRN
Status: DISCONTINUED | OUTPATIENT
Start: 2024-01-01 | End: 2024-01-01 | Stop reason: HOSPADM

## 2024-01-01 RX ORDER — HYDRALAZINE HYDROCHLORIDE 20 MG/ML
10-20 INJECTION INTRAMUSCULAR; INTRAVENOUS EVERY 30 MIN PRN
Status: CANCELLED | OUTPATIENT
Start: 2024-01-01

## 2024-01-01 RX ORDER — SODIUM CHLORIDE 9 MG/ML
INJECTION, SOLUTION INTRAVENOUS CONTINUOUS
Status: CANCELLED | OUTPATIENT
Start: 2024-01-01

## 2024-01-01 RX ORDER — ACETAMINOPHEN 325 MG/1
650 TABLET ORAL EVERY 4 HOURS PRN
Status: CANCELLED | OUTPATIENT
Start: 2024-01-01

## 2024-01-01 RX ORDER — MINERAL OIL/HYDROPHIL PETROLAT
OINTMENT (GRAM) TOPICAL
Status: DISCONTINUED | OUTPATIENT
Start: 2024-01-01 | End: 2024-01-01 | Stop reason: HOSPADM

## 2024-01-01 RX ORDER — MORPHINE SULFATE 2 MG/ML
2 INJECTION, SOLUTION INTRAMUSCULAR; INTRAVENOUS EVERY 4 HOURS
Status: DISCONTINUED | OUTPATIENT
Start: 2024-01-01 | End: 2024-01-01 | Stop reason: HOSPADM

## 2024-01-01 RX ORDER — LIDOCAINE HYDROCHLORIDE 20 MG/ML
SOLUTION OROPHARYNGEAL
Status: COMPLETED
Start: 2024-01-01 | End: 2024-01-01

## 2024-01-01 RX ORDER — INSULIN LISPRO 100 [IU]/ML
0-6 INJECTION, SOLUTION INTRAVENOUS; SUBCUTANEOUS AT BEDTIME
COMMUNITY

## 2024-01-01 RX ORDER — LIDOCAINE HYDROCHLORIDE 20 MG/ML
JELLY TOPICAL ONCE
Status: COMPLETED | OUTPATIENT
Start: 2024-01-01 | End: 2024-01-01

## 2024-01-01 RX ORDER — MORPHINE SULFATE 4 MG/ML
2 INJECTION, SOLUTION INTRAMUSCULAR; INTRAVENOUS EVERY 4 HOURS
Status: DISCONTINUED | OUTPATIENT
Start: 2024-01-01 | End: 2024-01-01

## 2024-01-01 RX ORDER — MAGNESIUM OXIDE 400 MG/1
400 TABLET ORAL EVERY 4 HOURS
Status: DISCONTINUED | OUTPATIENT
Start: 2024-01-01 | End: 2024-01-01

## 2024-01-01 RX ORDER — ACETAMINOPHEN 650 MG/1
650 SUPPOSITORY RECTAL EVERY 4 HOURS PRN
Status: CANCELLED | OUTPATIENT
Start: 2024-01-01

## 2024-01-01 RX ORDER — LIDOCAINE HYDROCHLORIDE 20 MG/ML
5 SOLUTION OROPHARYNGEAL ONCE
Status: COMPLETED | OUTPATIENT
Start: 2024-01-01 | End: 2024-01-01

## 2024-01-01 RX ORDER — HEPARIN SODIUM 200 [USP'U]/100ML
1 INJECTION, SOLUTION INTRAVENOUS EVERY 5 MIN PRN
Status: DISCONTINUED | OUTPATIENT
Start: 2024-01-01 | End: 2024-01-01 | Stop reason: HOSPADM

## 2024-01-01 RX ORDER — ATROPINE SULFATE 10 MG/ML
2 SOLUTION/ DROPS OPHTHALMIC EVERY 4 HOURS PRN
Status: DISCONTINUED | OUTPATIENT
Start: 2024-01-01 | End: 2024-01-01

## 2024-01-01 RX ORDER — IODIXANOL 320 MG/ML
150 INJECTION, SOLUTION INTRAVASCULAR ONCE
Status: COMPLETED | OUTPATIENT
Start: 2024-01-01 | End: 2024-01-01

## 2024-01-01 RX ORDER — HEPARIN SODIUM 200 [USP'U]/100ML
1 INJECTION, SOLUTION INTRAVENOUS CONTINUOUS PRN
Status: CANCELLED | OUTPATIENT
Start: 2024-01-01

## 2024-01-01 RX ORDER — MINERAL OIL/HYDROPHIL PETROLAT
OINTMENT (GRAM) TOPICAL
Status: CANCELLED | OUTPATIENT
Start: 2024-01-01

## 2024-01-01 RX ORDER — POLYETHYLENE GLYCOL 3350 17 G/17G
17 POWDER, FOR SOLUTION ORAL DAILY
Status: DISCONTINUED | OUTPATIENT
Start: 2024-01-01 | End: 2024-01-01

## 2024-01-01 RX ORDER — LORAZEPAM 2 MG/ML
1 INJECTION INTRAMUSCULAR EVERY 6 HOURS
Status: DISCONTINUED | OUTPATIENT
Start: 2024-01-01 | End: 2024-01-01

## 2024-01-01 RX ORDER — TAMSULOSIN HYDROCHLORIDE 0.4 MG/1
0.4 CAPSULE ORAL DAILY
COMMUNITY

## 2024-01-01 RX ORDER — CARBOXYMETHYLCELLULOSE SODIUM 5 MG/ML
1-2 SOLUTION/ DROPS OPHTHALMIC
Status: DISCONTINUED | OUTPATIENT
Start: 2024-01-01 | End: 2024-01-01

## 2024-01-01 RX ADMIN — SODIUM CHLORIDE: 9 INJECTION, SOLUTION INTRAVENOUS at 14:26

## 2024-01-01 RX ADMIN — DEXTROSE MONOHYDRATE 25 ML: 25 INJECTION, SOLUTION INTRAVENOUS at 19:50

## 2024-01-01 RX ADMIN — FENTANYL CITRATE 50 MCG: 50 INJECTION, SOLUTION INTRAMUSCULAR; INTRAVENOUS at 14:21

## 2024-01-01 RX ADMIN — LORAZEPAM 1 MG: 2 INJECTION INTRAMUSCULAR; INTRAVENOUS at 19:32

## 2024-01-01 RX ADMIN — LISINOPRIL 20 MG: 20 TABLET ORAL at 18:26

## 2024-01-01 RX ADMIN — ETOMIDATE 20 MG: 20 INJECTION, SOLUTION INTRAVENOUS at 12:49

## 2024-01-01 RX ADMIN — CEFAZOLIN SODIUM 2 G: 2 INJECTION, SOLUTION INTRAVENOUS at 16:42

## 2024-01-01 RX ADMIN — ONDANSETRON 4 MG: 2 INJECTION INTRAMUSCULAR; INTRAVENOUS at 18:35

## 2024-01-01 RX ADMIN — MORPHINE SULFATE 2 MG: 2 INJECTION, SOLUTION INTRAMUSCULAR; INTRAVENOUS at 02:41

## 2024-01-01 RX ADMIN — LIDOCAINE HYDROCHLORIDE 5 ML: 20 SOLUTION OROPHARYNGEAL at 10:00

## 2024-01-01 RX ADMIN — IODIXANOL 50 ML: 320 INJECTION, SOLUTION INTRAVASCULAR at 14:37

## 2024-01-01 RX ADMIN — FENTANYL CITRATE 100 MCG: 50 INJECTION, SOLUTION INTRAMUSCULAR; INTRAVENOUS at 12:54

## 2024-01-01 RX ADMIN — PROPOFOL 15 MCG/KG/MIN: 10 INJECTION, EMULSION INTRAVENOUS at 01:07

## 2024-01-01 RX ADMIN — NOREPINEPHRINE BITARTRATE 0.03 MCG/KG/MIN: 0.06 INJECTION, SOLUTION INTRAVENOUS at 14:25

## 2024-01-01 RX ADMIN — INSULIN ASPART 2 UNITS: 100 INJECTION, SOLUTION INTRAVENOUS; SUBCUTANEOUS at 23:31

## 2024-01-01 RX ADMIN — MORPHINE SULFATE 2 MG: 2 INJECTION, SOLUTION INTRAMUSCULAR; INTRAVENOUS at 01:47

## 2024-01-01 RX ADMIN — MORPHINE SULFATE 2 MG: 2 INJECTION, SOLUTION INTRAMUSCULAR; INTRAVENOUS at 12:13

## 2024-01-01 RX ADMIN — DEXTROSE MONOHYDRATE 25 ML: 25 INJECTION, SOLUTION INTRAVENOUS at 01:17

## 2024-01-01 RX ADMIN — INSULIN ASPART 1 UNITS: 100 INJECTION, SOLUTION INTRAVENOUS; SUBCUTANEOUS at 12:18

## 2024-01-01 RX ADMIN — GLYCOPYRROLATE 0.4 MG: 0.2 INJECTION INTRAMUSCULAR; INTRAVENOUS at 00:12

## 2024-01-01 RX ADMIN — MORPHINE SULFATE 2 MG: 2 INJECTION, SOLUTION INTRAMUSCULAR; INTRAVENOUS at 03:23

## 2024-01-01 RX ADMIN — NICARDIPINE HYDROCHLORIDE 2.5 MG/HR: 0.2 INJECTION, SOLUTION INTRAVENOUS at 12:20

## 2024-01-01 RX ADMIN — LORAZEPAM 1 MG: 2 INJECTION INTRAMUSCULAR; INTRAVENOUS at 13:20

## 2024-01-01 RX ADMIN — MIDAZOLAM 1 MG: 1 INJECTION INTRAMUSCULAR; INTRAVENOUS at 14:47

## 2024-01-01 RX ADMIN — MORPHINE SULFATE 2 MG: 2 INJECTION, SOLUTION INTRAMUSCULAR; INTRAVENOUS at 00:47

## 2024-01-01 RX ADMIN — INSULIN ASPART 4 UNITS: 100 INJECTION, SOLUTION INTRAVENOUS; SUBCUTANEOUS at 07:38

## 2024-01-01 RX ADMIN — DEXTROSE MONOHYDRATE 25 ML: 25 INJECTION, SOLUTION INTRAVENOUS at 00:10

## 2024-01-01 RX ADMIN — ACETAMINOPHEN 650 MG: 325 TABLET ORAL at 07:38

## 2024-01-01 RX ADMIN — ENOXAPARIN SODIUM 40 MG: 40 INJECTION SUBCUTANEOUS at 16:17

## 2024-01-01 RX ADMIN — LABETALOL HYDROCHLORIDE 10 MG: 5 INJECTION, SOLUTION INTRAVENOUS at 02:18

## 2024-01-01 RX ADMIN — PROPOFOL 40 MCG/KG/MIN: 10 INJECTION, EMULSION INTRAVENOUS at 16:40

## 2024-01-01 RX ADMIN — GLYCOPYRROLATE 0.4 MG: 0.2 INJECTION, SOLUTION INTRAMUSCULAR; INTRAVENOUS at 13:19

## 2024-01-01 RX ADMIN — LABETALOL HYDROCHLORIDE 10 MG: 5 INJECTION, SOLUTION INTRAVENOUS at 22:25

## 2024-01-01 RX ADMIN — Medication 12.5 MG: at 20:41

## 2024-01-01 RX ADMIN — Medication 1 MG: at 20:41

## 2024-01-01 RX ADMIN — MORPHINE SULFATE 2 MG: 2 INJECTION, SOLUTION INTRAMUSCULAR; INTRAVENOUS at 10:50

## 2024-01-01 RX ADMIN — GABAPENTIN 200 MG: 100 CAPSULE ORAL at 07:38

## 2024-01-01 RX ADMIN — INSULIN ASPART 2 UNITS: 100 INJECTION, SOLUTION INTRAVENOUS; SUBCUTANEOUS at 16:25

## 2024-01-01 RX ADMIN — MAGNESIUM SULFATE HEPTAHYDRATE 2 G: 2 INJECTION, SOLUTION INTRAVENOUS at 09:15

## 2024-01-01 RX ADMIN — HUMAN ALBUMIN MICROSPHERES AND PERFLUTREN 5 ML: 10; .22 INJECTION, SOLUTION INTRAVENOUS at 15:30

## 2024-01-01 RX ADMIN — GLYCOPYRROLATE 0.4 MG: 0.2 INJECTION INTRAMUSCULAR; INTRAVENOUS at 16:36

## 2024-01-01 RX ADMIN — IOPAMIDOL 67 ML: 755 INJECTION, SOLUTION INTRAVENOUS at 12:17

## 2024-01-01 RX ADMIN — MORPHINE SULFATE 2 MG: 2 INJECTION, SOLUTION INTRAMUSCULAR; INTRAVENOUS at 23:33

## 2024-01-01 RX ADMIN — MORPHINE SULFATE 2 MG: 2 INJECTION, SOLUTION INTRAMUSCULAR; INTRAVENOUS at 03:42

## 2024-01-01 RX ADMIN — MORPHINE SULFATE 2 MG: 2 INJECTION, SOLUTION INTRAMUSCULAR; INTRAVENOUS at 19:32

## 2024-01-01 RX ADMIN — LABETALOL HYDROCHLORIDE 10 MG: 5 INJECTION, SOLUTION INTRAVENOUS at 13:30

## 2024-01-01 RX ADMIN — Medication 100 MCG: at 13:09

## 2024-01-01 RX ADMIN — ALLOPURINOL 100 MG: 100 TABLET ORAL at 16:17

## 2024-01-01 RX ADMIN — MORPHINE SULFATE 2 MG: 2 INJECTION, SOLUTION INTRAMUSCULAR; INTRAVENOUS at 02:06

## 2024-01-01 RX ADMIN — LABETALOL HYDROCHLORIDE 10 MG: 5 INJECTION, SOLUTION INTRAVENOUS at 04:08

## 2024-01-01 RX ADMIN — SODIUM CHLORIDE, SODIUM GLUCONATE, SODIUM ACETATE, POTASSIUM CHLORIDE AND MAGNESIUM CHLORIDE: 526; 502; 368; 37; 30 INJECTION, SOLUTION INTRAVENOUS at 19:02

## 2024-01-01 RX ADMIN — HYDRALAZINE HYDROCHLORIDE 10 MG: 20 INJECTION INTRAMUSCULAR; INTRAVENOUS at 18:26

## 2024-01-01 RX ADMIN — ACETAMINOPHEN 650 MG: 325 TABLET ORAL at 22:25

## 2024-01-01 RX ADMIN — INSULIN ASPART 1 UNITS: 100 INJECTION, SOLUTION INTRAVENOUS; SUBCUTANEOUS at 03:49

## 2024-01-01 RX ADMIN — INSULIN ASPART 3 UNITS: 100 INJECTION, SOLUTION INTRAVENOUS; SUBCUTANEOUS at 20:41

## 2024-01-01 RX ADMIN — POLYETHYLENE GLYCOL 3350 17 G: 17 POWDER, FOR SOLUTION ORAL at 07:37

## 2024-01-01 RX ADMIN — PANTOPRAZOLE SODIUM 40 MG: 40 INJECTION, POWDER, FOR SOLUTION INTRAVENOUS at 09:16

## 2024-01-01 RX ADMIN — GLYCOPYRROLATE 0.4 MG: 0.2 INJECTION INTRAMUSCULAR; INTRAVENOUS at 21:13

## 2024-01-01 RX ADMIN — PROPOFOL 15 MCG/KG/MIN: 10 INJECTION, EMULSION INTRAVENOUS at 12:56

## 2024-01-01 RX ADMIN — MORPHINE SULFATE 2 MG: 2 INJECTION, SOLUTION INTRAMUSCULAR; INTRAVENOUS at 04:14

## 2024-01-01 RX ADMIN — Medication 40 MG: at 07:38

## 2024-01-01 RX ADMIN — Medication 15 ML: at 07:37

## 2024-01-01 RX ADMIN — MORPHINE SULFATE 2 MG: 2 INJECTION, SOLUTION INTRAMUSCULAR; INTRAVENOUS at 03:04

## 2024-01-01 RX ADMIN — MORPHINE SULFATE 2 MG: 2 INJECTION, SOLUTION INTRAMUSCULAR; INTRAVENOUS at 01:30

## 2024-01-01 RX ADMIN — MIDAZOLAM HYDROCHLORIDE 2 MG: 1 INJECTION, SOLUTION INTRAMUSCULAR; INTRAVENOUS at 12:55

## 2024-01-01 RX ADMIN — HEPARIN SODIUM 6 BAG: 200 INJECTION, SOLUTION INTRAVENOUS at 14:09

## 2024-01-01 RX ADMIN — LABETALOL HYDROCHLORIDE 10 MG: 5 INJECTION, SOLUTION INTRAVENOUS at 17:55

## 2024-01-01 RX ADMIN — LABETALOL HYDROCHLORIDE 10 MG: 5 INJECTION, SOLUTION INTRAVENOUS at 07:52

## 2024-01-01 RX ADMIN — ASPIRIN 81 MG CHEWABLE TABLET 81 MG: 81 TABLET CHEWABLE at 07:38

## 2024-01-01 RX ADMIN — MORPHINE SULFATE 2 MG: 2 INJECTION, SOLUTION INTRAMUSCULAR; INTRAVENOUS at 16:36

## 2024-01-01 RX ADMIN — SENNOSIDES AND DOCUSATE SODIUM 1 TABLET: 8.6; 5 TABLET ORAL at 07:38

## 2024-01-01 RX ADMIN — MAGNESIUM OXIDE TAB 400 MG (241.3 MG ELEMENTAL MG) 400 MG: 400 (241.3 MG) TAB at 07:38

## 2024-01-01 RX ADMIN — MORPHINE SULFATE 2 MG: 2 INJECTION, SOLUTION INTRAMUSCULAR; INTRAVENOUS at 02:25

## 2024-01-01 RX ADMIN — MIDAZOLAM 1 MG: 1 INJECTION INTRAMUSCULAR; INTRAVENOUS at 14:22

## 2024-01-01 RX ADMIN — HALOPERIDOL LACTATE 2 MG: 5 INJECTION, SOLUTION INTRAMUSCULAR at 20:40

## 2024-01-01 RX ADMIN — LISINOPRIL 20 MG: 20 TABLET ORAL at 07:38

## 2024-01-01 RX ADMIN — GLYCOPYRROLATE 0.2 MG: 0.2 INJECTION INTRAMUSCULAR; INTRAVENOUS at 09:24

## 2024-01-01 RX ADMIN — GABAPENTIN 200 MG: 100 CAPSULE ORAL at 20:41

## 2024-01-01 RX ADMIN — LABETALOL HYDROCHLORIDE 20 MG: 5 INJECTION, SOLUTION INTRAVENOUS at 18:06

## 2024-01-01 RX ADMIN — LIDOCAINE HYDROCHLORIDE 10 ML: 10 INJECTION, SOLUTION EPIDURAL; INFILTRATION; INTRACAUDAL; PERINEURAL at 14:09

## 2024-01-01 RX ADMIN — ATROPINE SULFATE 2 DROP: 10 SOLUTION/ DROPS OPHTHALMIC at 09:05

## 2024-01-01 RX ADMIN — ALLOPURINOL 100 MG: 100 TABLET ORAL at 07:38

## 2024-01-01 RX ADMIN — ROCURONIUM 50 MG: 50 INJECTION, SOLUTION INTRAVENOUS at 12:50

## 2024-01-01 RX ADMIN — DEXTROSE AND SODIUM CHLORIDE: 5; 900 INJECTION, SOLUTION INTRAVENOUS at 02:05

## 2024-01-01 RX ADMIN — LIDOCAINE HYDROCHLORIDE 5 ML: 20 SOLUTION ORAL at 10:00

## 2024-01-01 RX ADMIN — Medication 20.5 MG: at 12:40

## 2024-01-01 RX ADMIN — SENNOSIDES AND DOCUSATE SODIUM 1 TABLET: 8.6; 5 TABLET ORAL at 20:41

## 2024-01-01 RX ADMIN — LORAZEPAM 1 MG: 2 INJECTION INTRAMUSCULAR; INTRAVENOUS at 01:07

## 2024-01-01 RX ADMIN — ASPIRIN 81 MG CHEWABLE TABLET 81 MG: 81 TABLET CHEWABLE at 16:17

## 2024-01-01 RX ADMIN — ATROPINE SULFATE 2 DROP: 10 SOLUTION/ DROPS OPHTHALMIC at 01:11

## 2024-01-01 RX ADMIN — FENTANYL CITRATE 50 MCG: 50 INJECTION, SOLUTION INTRAMUSCULAR; INTRAVENOUS at 14:04

## 2024-01-01 ASSESSMENT — ACTIVITIES OF DAILY LIVING (ADL)
ADLS_ACUITY_SCORE: 47
ADLS_ACUITY_SCORE: 45
DEPENDENT_IADLS:: INDEPENDENT
ADLS_ACUITY_SCORE: 47
ADLS_ACUITY_SCORE: 35
ADLS_ACUITY_SCORE: 39
ADLS_ACUITY_SCORE: 35
ADLS_ACUITY_SCORE: 45
ADLS_ACUITY_SCORE: 35
ADLS_ACUITY_SCORE: 39
ADLS_ACUITY_SCORE: 39
ADLS_ACUITY_SCORE: 45
ADLS_ACUITY_SCORE: 45
ADLS_ACUITY_SCORE: 47
ADLS_ACUITY_SCORE: 39
ADLS_ACUITY_SCORE: 45
ADLS_ACUITY_SCORE: 47
ADLS_ACUITY_SCORE: 39
ADLS_ACUITY_SCORE: 45
ADLS_ACUITY_SCORE: 35
ADLS_ACUITY_SCORE: 45
ADLS_ACUITY_SCORE: 39
ADLS_ACUITY_SCORE: 45
ADLS_ACUITY_SCORE: 45
ADLS_ACUITY_SCORE: 47
ADLS_ACUITY_SCORE: 39
ADLS_ACUITY_SCORE: 44
ADLS_ACUITY_SCORE: 45
ADLS_ACUITY_SCORE: 39
ADLS_ACUITY_SCORE: 47
ADLS_ACUITY_SCORE: 39
ADLS_ACUITY_SCORE: 45
ADLS_ACUITY_SCORE: 39
ADLS_ACUITY_SCORE: 45
ADLS_ACUITY_SCORE: 45
ADLS_ACUITY_SCORE: 39
ADLS_ACUITY_SCORE: 45
ADLS_ACUITY_SCORE: 35
ADLS_ACUITY_SCORE: 44
ADLS_ACUITY_SCORE: 45
ADLS_ACUITY_SCORE: 39
ADLS_ACUITY_SCORE: 45
ADLS_ACUITY_SCORE: 39
ADLS_ACUITY_SCORE: 35
ADLS_ACUITY_SCORE: 45
ADLS_ACUITY_SCORE: 49
ADLS_ACUITY_SCORE: 45
ADLS_ACUITY_SCORE: 47
ADLS_ACUITY_SCORE: 45
ADLS_ACUITY_SCORE: 39
ADLS_ACUITY_SCORE: 35
ADLS_ACUITY_SCORE: 45
ADLS_ACUITY_SCORE: 39
ADLS_ACUITY_SCORE: 45
ADLS_ACUITY_SCORE: 49

## 2024-01-01 ASSESSMENT — VISUAL ACUITY
OU: NOT TESTABLE

## 2024-01-01 ASSESSMENT — COLUMBIA-SUICIDE SEVERITY RATING SCALE - C-SSRS: IS THE PATIENT NOT ABLE TO COMPLETE C-SSRS: UNABLE TO VERBALIZE

## 2024-09-19 PROBLEM — I63.9 CVA (CEREBROVASCULAR ACCIDENT) (H): Status: ACTIVE | Noted: 2024-01-01

## 2024-09-19 NOTE — IR NOTE
Neuro IR Nursing Procedure Note    Patient Name: Homer Joseph  Medical Record Number: [id number]  Today's Date: 9/19/2024      Proceduralist: ***    Event Time ()   Procedure Start ***   Groin/Radial Puncture ***   Clot ID ***   Pass {pass times:[id number]}   IA Thrombolytic Start ***   Vessel Recanalization ***   Procedure End ***       Sedation start time: ***  Sedation end time: ***  Sedation medications administered: ***   Total sedation time: ***    Closure Device: ***    Stroke Treatment Type: {stroke treatment type or not applicable:307499}    Post-procedure Education  The following information has been reviewed with the {PATIENT. FAMILY:419518}:    1. Maintain bedrest with {affected limb:749125} straight until *** .  2. Notify nurse immediately if having any bleeding from site, any changes in sensation, or changes in strength.  3. Notify nurse if you have to go to bathroom, so staff will assist you.   4. Nurses will be doing frequent checks afterwards, including your foot or arm pulses, vitals, groin site and neuro exam.   5. Press your call light if you have any questions.    Learner's Response to Angiogram Education: {education post procedure:211057}     Nathalia Decker RN

## 2024-09-19 NOTE — PROGRESS NOTES
[facility]     Endovascular Surgical Neuroradiology Pre-Procedure Note      HPI:  Homer Joseph is a 89 year old male with vascular risk factors of HTN, HLD, DM who presented with sudden onset right sided weakness and global aphasia. LKN estimated to be an hour ago. 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 TNK has been given at OSH. He is here today for mechanical thrombectomy.    Medical History:  Reviewed    Surgical History:  Reviewed    Family History:  Reviewed    Social History:  Reviewed    Allergies:  Reviewed    Is there a contrast allergy?  No    Medications:  I have reviewed this patient's current medications.    ROS:  Review of systems not obtained due to patient factors - intubation    PHYSICAL EXAMINATION  Vital Signs:  B/P: Data Unavailable,  T: Data Unavailable,  P: Data Unavailable,  R: Data Unavailable    Cardio:  RRR  Pulmonary:  no respiratory distress  Abdomen:  soft, non-tender, non-distended, bowel sounds present    Neurologic  Patient arrived here sedated and intubated     Pre-procedure National Institutes of Health Stroke Scale:   Not applicable    LABS  (most recent Cr, BUN, GFR, PLT, INR, PTT within the past 7 days):  Recent Labs   Lab 09/19/24  1222   CR 1.17   BUN 21.7   GFRESTIMATED 60*      INR 1.10   PTT 26        Platelet Function P2Y12 (PRU):  Not applicable      ASSESSMENT:    PLAN:   Mechanical thrombectomy     PRE-PROCEDURE SEDATION ASSESSMENT     Pre-Procedure Sedation Assessment done at 1330    Expected Level:  Deep Sedation    Indication:  Sedation is required to allow for neurointerventional procedure.    Consent obtained from spouse after discussing the risks, benefits and alternatives. Obtained by phone    PO Intake:  Not NPO but emergent condition outweighs risk.    ASA Class:  Class 2 - MILD SYSTEMIC DISEASE, NO ACUTE PROBLEMS, NO FUNCTIONAL LIMITATIONS.    Mallampati:  Grade 2:  Soft palate, base of uvula,  tonsillar pillars, and portion of posterior pharyngeal wall visible    History and physical reviewed and no updates needed. I have reviewed the lab findings, diagnostic data, medications, and the plan for sedation. I have determined this patient to be an appropriate candidate for the planned sedation and procedure and have reassessed the patient IMMEDIATELY PRIOR to sedation and procedure.    Patient was discussed with the Attending, Dr. Morales, who agrees with the plan.    Gloria Sheppard MD   Pager: [phone]

## 2024-09-19 NOTE — PROCEDURES
[facility]     Endovascular Surgical Neuroradiology Post-Procedure Note    Pre-Procedure Diagnosis:  Left M1 occlusion  Post-Procedure Diagnosis:   as above    Procedure(s):   Endovascular treatment of acute ischemic stroke    - TICI Score: 3    Findings:  Left M1 occlusion with total of 2 passes with aspiration only for 1 pass and 1 pass with stent retriever and aspiration with TICI3 recanalization.     Plan:  Bedrest 2 hours  SBP goal 100-160  Q1h neurochecks  Admit to ICU  Head CT dual energy   Stat head Ct for any neuro changes    Primary Surgeon:  Dr. Amador Morales  Secondary Surgeon:  Not applicable  Secondary Surgeon Review:  None  Fellow:  Gloria Sheppard MD, Pita Mcintosh MD  Additional Assistants:  none    Prior to the start of the procedure and with procedural staff participation, I verbally confirmed: the patient s identity using two indicators, relevant allergies, that the procedure was appropriate and matched the consent or emergent situation, and that the correct equipment/implants were available. Immediately prior to starting the procedure I conducted the Time Out with the procedural staff and re-confirmed the patient s name, procedure, and site/side. (The Joint Commission universal protocol was followed.)  Yes    PRU value: Not applicable    Anesthesia:  General anesthesia on propofol infusion  Medications:9ml 1 % subQ   Lidocaine, Propofol gtt , fentanyl 100 mcgIV, 1mg IV Versed   Puncture site:  Right Femoral Artery    Fluoroscopy time (minutes):  22.3  Radiation dose (mGy):   575.9     Contrast amount (mL):   50      Estimated blood loss (mL):  25    Closure:  Device8F Angioseal     Disposition:  Will be followed in hospital by the Neuro Critical Care/Stroke team.        Sedation Post-Procedure Summary    Sedatives: Propofol    Vital signs and pulse oximetry were monitored and remained stable throughout the procedure, and sedation was  maintained until the procedure was complete.  The patient was monitored by staff until sedation discharge criteria were met.    Patient tolerance:  Patient tolerated the procedure well with no immediate complications.    Time of sedation in minutes:  26 minutes from beginning to end of physician one to one monitoring.    Pita Mcintosh MD  Endovascular Surgical Neuroradiology Fellow  [facility]  [phone]    Endovascular Surgical Neuroradiology staff is Dr. Morales

## 2024-09-19 NOTE — IR NOTE
Neuro IR Nursing Procedure Note    Patient Name: Homer Joseph  Medical Record Number: [id number]  Today's Date: 9/19/2024      Proceduralist: Dr. Morales, Dr. Mcintosh, Dr. Sheppard     Event Time ()   Procedure Start 1404   Groin/Radial Puncture 1405   Clot ID [id number]   Pass 1st pass 1425  2nd pass 1431     IA Thrombolytic Start NA   Vessel Recanalization 1433   Procedure End 1440       Sedation start time: 1404  Sedation end time: 1440  Sedation medications administered: propofol drip, 100 mcg fentanyl and 2 mg versed  Total sedation time: 36 mins    Closure Device: Angioseal right femoral artery at 1438.    Stroke Treatment Type: Aspiration    Post-procedure Education  The following information has been reviewed with the patient:    1. Maintain bedrest with right leg straight until 1640 .  2. Notify nurse immediately if having any bleeding from site, any changes in sensation, or changes in strength.  3. Notify nurse if you have to go to bathroom, so staff will assist you.   4. Nurses will be doing frequent checks afterwards, including your foot or arm pulses, vitals, groin site and neuro exam.   5. Press your call light if you have any questions.    Learner's Response to Angiogram Education: patient not ready due to sedation     Nathalia Decker RN

## 2024-09-19 NOTE — ED NOTES
Per Neuro team via stroke video, Lisa the wife and son-in-law gave verbal consent via telephone to give TNK.

## 2024-09-19 NOTE — PROGRESS NOTES
Pt arrived in IR via EMS on ventilator CMV R20/400/+5/30%. Intubated with 7.5 ETT 24 @ teeth. Pt placed on facility ventilator, ambu bag at bedside.    Lisa Kumar, RT on 9/19/2024 at 2:21 PM
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
## 2024-09-19 NOTE — H&P
"Neurocritical Care History & Physical    Reason for critical care admission: Acute Ischemic Stroke  Admitting Team: [facility]  Date of Service:  09/19/2024  Date of Admission:  9/19/2024  Hospital Day: 1    Assessment/Plan  Homer Joseph is a 88yo with a PMH significant for recent stroke (4/15/2024 R hemispheric 2/2 right ICA 90% stenosis s/p right CEA), DM, dysphagia (had PEG tube until 3 weeks ago), HLD, gout, CKD. He was playing cards with friends at 11:10 am when he suddenly \"slumped over.\" Reportedly prior to that time he was asymptomatic. On initial ED exam - R-facial weakness, R-sided weakness, and L-gaze preference. CTH negative for hemorrhage.  CTA H&N revealed L M1 occlusion. BP immediately prior to administration of TNK was 169/80 and 158/70.  TNK was administered at 12:41 PM. Presenting BP was 234/110. Patient was transferred to [facility] on 9/19/2024 for thrombectomy. DSA revealed a left M1 occlusion and it was treated with a total of 2 passes with aspiration only for 1 pass and 1 pass with stent retriever and aspiration with TICI 3 recanalization.    Neuro  #Left MCA (M1 occlusion) Ischemic Stroke, ESUS  #S/p mechanical thrombectomy with TICI 3 recanalization   #Prior Stroke 4/15/2024 s/p MUKUND CEA  -Neurochecks every 1 hrs per post-thrombectomy protocol  -SBP goal 100-160 mmHg  -MRI Brain wwo contrast  -CTH dual energy post-thrombectomy:   \"No evidence for hemorrhage following thrombectomy.\"  -[facility] 24-hrs post-thrombectomy   -HOB > 30   -PT/OT/SLP as indicated     #Analgesics & sedation  RASS goal: 0 to -1  -Tylenol 650 mg Q4h PRN  -Stop Propofol infusion     CV  #HTN  #HLD  -Cardiac monitoring  -SBP goal 100-160 mmHg  -Holding PTA Lisinopril 5 mg daily  -PRN Labetalol and Hydralazine  -LDL; Hold statin until back  -TTE and EKG    Resp  #Intubated for airway protection for procedure  Oxygen/vent: MV, will adjust vent settings  FiO2 (%): 30 %, Resp: 20, Vent Mode: CPAP/PS, Resp Rate (Set): 14 breaths/min, Tidal " Volume (Set, mL): 400 mL, PEEP (cm H2O): 5 cmH2O, Pressure Support (cm H2O): 5 cmH2O, Resp Rate (Set): 14 breaths/min, Tidal Volume (Set, mL): 400 mL, PEEP (cm H2O): 5 cmH2O  -PST as able, assess for extubation   -CXR, ABG  -Continuous pulse ox  -Maintain O2 saturations greater than 92%     GI  Diet: NPO  -Plan for small bore in the am  -Consult dietician for placement and TF's    Last BM: PTA  GI prophylaxis: Pantoprazole  -Bowel regimen: Scheduled Senna-docusate BID and Miralax daily     Renal/  #CKD Stage 2  #Gout  #Hyponatremia  #Hyperkalemia  -Daily BMP, repeat BMP upon arrival to unit   -IV fluids: Plasma Lyte @ 50 ml/hr  -Electrolyte replacement protocol    Endo  #T2DM  -Hgb A1c, TSH   -Hold PTA Glipzide 10 mg daily  -Hold Lantus 25 U bid  -High intensity sliding scale insulin  -Monitor glucose levels     Heme  #Microcytic Anemia likely 2/2 chronic disease  -Daily CBC  -Hgb goal >7, plt goal >50k  -Transfuse to meet Hgb and plt goals    ID  -Afebrile  -Daily CBC  -Follow temperature curve    ICU Checklist  Lines/tubes/drains: PIV, ETT, dominique   FEN: NPO, replacement protocol   PPX: DVT - SCDs; GI - Pantoprazole.  Code: Full code until further discussions  Dispo: ICU - NCC    I spent 75 minutes of critical care time on the unit/floor managing the care of Homer Joseph excluding time performing procedures. Upon evaluation, this patient had a high probability of imminent or life-threatening deterioration due to L MCA stroke s/p TNK & thrombectomy, which required my direct attention, intervention, and personal management. Greater than 50% of my time was spent at the bedside counseling the patient and/or coordinating care including chart review, history, exam, documentation, and further activities per this note. I have personally reviewed the following data/imaging over the past 24 hours.     The patient was seen and discussed with the NCC attending, Dr. Sterlign Abreu.    Ritika TAY, CNP  Neurocritical  "care nurse practitioner  Ascom: [phone] available - [phone]     History of Present Illness:  Homer Joseph is a 89yo with a PMH significant for recent stroke (4/15/2024 R hemispheric 2/2 right ICA 90% stenosis s/p right CEA), DM, dysphagia (had PEG tube until 3 weeks ago), HLD, gout, CKD. He was playing cards with friends at 11:10 am when he suddenly \"slumped over.\" Reportedly prior to that time he was asymptomatic. On initial ED exam - R-facial weakness, R-sided weakness, and L-gaze preference. CTH negative for hemorrhage.  CTA H&N revealed L M1 occlusion. BP immediately prior to administration of TNK was 169/80 and 158/70.  TNK was administered at 12:41 PM. Presenting BP was 234/110. Patient was transferred to [facility] on 9/19/2024 for thrombectomy. DSA revealed a left M1 occlusion and it was treated with a total of 2 passes with aspiration only for 1 pass and 1 pass with stent retriever and aspiration with TICI 3 recanalization.      Allergies   Allergen Reactions    Sulfa (Sulfonamide Antibiotics) [Sulfa Antibiotics] Hives     And itching in hands and feet       Current Medications:  Current Facility-Administered Medications   Medication Dose Route Frequency Provider Last Rate Last Admin    insulin aspart (NovoLOG) injection (RAPID ACTING)  1-12 Units Subcutaneous Q4H Ritika Smart CNP        [START ON 9/20/2024] pantoprazole (PROTONIX) IV push injection 40 mg  40 mg Intravenous Daily Ritika Smart CNP        [START ON 9/20/2024] polyethylene glycol (MIRALAX) Packet 17 g  17 g Oral or NG Tube Daily Ritika Smart CNP        senna-docusate (SENOKOT-S/PERICOLACE) 8.6-50 MG per tablet 1-2 tablet  1-2 tablet Oral or NG Tube BID Ritika Smart CNP        sodium chloride (PF) 0.9% PF flush 3 mL  3 mL Intracatheter Q8H Ritika Smart CNP           PRN Medications:  Current Facility-Administered Medications   Medication Dose Route Frequency Provider Last Rate Last Admin    acetaminophen (TYLENOL) " tablet 650 mg  650 mg Oral or Feeding Tube Q4H PRN Ritika Smart CNP        glucose gel 15-30 g  15-30 g Oral Q15 Min PRN Ritika Smart CNP        Or    dextrose 50 % injection 25-50 mL  25-50 mL Intravenous Q15 Min PRN Ritika Smart CNP        Or    glucagon injection 1 mg  1 mg Subcutaneous Q15 Min PRN Ritika Smart CNP        hydrALAZINE (APRESOLINE) injection 10-20 mg  10-20 mg Intravenous Q30 Min PRN Ritika Smart CNP        labetalol (NORMODYNE/TRANDATE) injection 10-20 mg  10-20 mg Intravenous Q15 Min PRN Ritika Smart CNP   10 mg at 09/19/24 1755    lidocaine (LMX4) cream   Topical Q1H PRN Ritika Smart CNP        lidocaine 1 % 0.1-1 mL  0.1-1 mL Other Q1H PRN Ritika Smart CNP        Medication Instruction - Avoid dextrose in IV solutions   Intravenous Continuous PRN Ritika Smart CNP        ondansetron (ZOFRAN) injection 4 mg  4 mg Intravenous Q6H PRN Ritika Smart CNP        prochlorperazine (COMPAZINE) injection 5 mg  5 mg Intravenous Q6H PRN Ritika Smart CNP        sodium chloride (PF) 0.9% PF flush 3 mL  3 mL Intracatheter q1 min prn Ritika Smart CNP           Infusions:  Current Facility-Administered Medications   Medication Dose Route Frequency Provider Last Rate Last Admin    Medication Instruction - Avoid dextrose in IV solutions   Intravenous Continuous PRN Ritika Smart CNP           Physical Examination:  Vitals: BP (!) 174/75   Pulse 68   Temp (!) 96  F (35.6  C) (Axillary)   Resp 20   SpO2 100%   General: Adult male patient, lying in bed  HEENT: Normocephalic, atraumatic, no icterus  Cardiac: Sinus rhythm on bedside monitor   Pulm: Mechanically ventilated, comfortable, expansion symmetric, no retractions or use of accessory muscles  Abdomen: Soft, non-distended abdomen   Extremities: Warm, no edema, appears adequately perfused  Skin: No rash or lesion observed on exposed skin, groin w/o hematoma  Psych: Calm  Neuro:   Mental status:  Opens eyes to voice, does not track, does not follow commands, intubated.   Cranial nerves: PERRL, conjugate gaze, strong cough and gag present with deep suction.   Motor: Normal bulk and tone. No abnormal movements. 4/5 strength in LUE, LLE with spontaneous purposeful movement. 0/5 strength in RUE with no active withdrawal to noxious stimuli. 1/5 strength in RLE with triple flexion to noxious stimuli.     Sensory: Grimaces, withdraws briskly on LUE, LLE. No withdrawal in RUE but will does respond to painful stimuli by reaching toward stimulus with left arm.  Coordination: REFUGIO, deferred.   Gait: REFUGIO, deferred.    NIHSS  Interval     1a. Level of Consciousness 1-->Not alert, but arousable by minor stimulation to obey, answer, or respond   1b. LOC Questions 2-->Answers neither question correctly   1c. LOC Commands 2-->Performs neither task correctly   2.   Best Gaze 0-->Normal   3.   Visual 0-->No visual loss (REFUGIO)   4.   Facial Palsy 0-->Normal symmetrical movements (Intubated, REFUGIO)   5a. Motor Arm, Left 2-->Some effort against gravity, limb cannot get to or maintain (if cued) 90 (or 45) degrees, drifts down to bed, but has some effort against gravity   5b. Motor Arm, Right 4-->No movement   6a. Motor Leg, Left 2-->Some effort against gravity, leg falls to bed by 5 secs, but has some effort against gravity   6b. Motor Leg, right 3-->No effort against gravity, leg falls to bed immediately   7.   Limb Ataxia 0-->Absent   8.   Sensory 0-->Normal, no sensory loss   9.   Best Language 0-->No aphasia, normal (Intubated, REFUGIO)   10. Dysarthria (UN) Intubated or other physical barrier   11. Extinction and Inattention  0-->No abnormality   Total 16 (09/19/24 1700)     Labs and Imaging:    Results for orders placed or performed during the hospital encounter of 09/19/24 (from the past 24 hour(s))   CT Head w/o Contrast    Narrative    CT HEAD W/O CONTRAST 9/19/2024 3:32 PM    Provided History: post thrombectomy dual  energy    Comparison: None.    Technique: Using multidetector thin collimation helical acquisition  technique, axial, coronal and sagittal CT images from the skull base  to the vertex were obtained without intravenous contrast.     Findings:      Chronic infarctions of the biparietal lobes and right occipital  chronic. Chronic-appearing juxtacortical and white matter infarctions  of the right frontal lobe. No evidence of intracranial hemorrhage,  mass effect, or midline shift. No acute loss of gray-white  differentiation. Diffuse mild-to-moderate cerebral parenchymal volume  loss with associated ventriculomegaly.    Ventricles are proportionate to the cerebral sulci. Clear basal  cisterns.    Intact bony calvaria and skull base. Mastoid air cells are clear.  Scattered paranasal sinus mucosal thickening/debris.      Impression    Impression:   No evidence for hemorrhage following thrombectomy.    I have personally reviewed the examination and initial interpretation  and I agree with the findings.    YONAS YAN MD         SYSTEM ID:  [id number]   Glucose by meter   Result Value Ref Range    GLUCOSE BY METER POCT 95 70 - 99 mg/dL   Basic metabolic panel   Result Value Ref Range    Sodium 136 135 - 145 mmol/L    Potassium 4.3 3.4 - 5.3 mmol/L    Chloride 106 98 - 107 mmol/L    Carbon Dioxide (CO2) 20 (L) 22 - 29 mmol/L    Anion Gap 10 7 - 15 mmol/L    Urea Nitrogen 20.1 8.0 - 23.0 mg/dL    Creatinine 1.01 0.67 - 1.17 mg/dL    GFR Estimate 72 >60 mL/min/1.73m2    Calcium 8.4 (L) 8.8 - 10.4 mg/dL    Glucose 84 70 - 99 mg/dL   XR Chest Port 1 View    Narrative    Portable chest    INDICATION: Verify endotracheal tube placement    COMPARISON: None    FINDINGS: Heart size normal. Atherosclerotic ectasia of the aortic  knob. Endotracheal tube is present with tip approximately 4 cm above  the karri. Azygous fissure incidentally noted in the right upper  line.       Impression    IMPRESSION: Endotracheal tube approximate  4 cm above the karri.  Atherosclerosis in the aorta.    ABNER RED MD         SYSTEM ID:  [id number]       All relevant imaging and laboratory values personally reviewed.

## 2024-09-19 NOTE — PROGRESS NOTES
Code stroke called: Pt in room 3 ER. Pt on RA. No accessory muscle use. No nasal flaring. No retractions. No airway compromise at this time. Will return if RT intervention needed.   
RT PROGRESS NOTE    VENT DAY# Ventilation Day(s): 1    Current Ventilator Settings:   FiO2 (%): 30 %, Resp: 20, Vent Mode: CMV/AC, Resp Rate (Set): 20 breaths/min, Tidal Volume (Set, mL): 400 mL, PEEP (cm H2O): 5 cmH2O, Resp Rate (Set): 20 breaths/min, Tidal Volume (Set, mL): 400 mL, PEEP (cm H2O): 5 cmH2O        Patient Parameters with above settings:  Ventilator - Patient   Patient Resp Rate : 20 breaths/min  Expiratory Vt (ml): 397  Minute Volume (ml): 7.94 L/min  Peak Inspiratory Pressure (cm H2O): 14 cmH2O  Mean Airway Pressure (cm H2O): 7.4 cmH2O  Dynamic Compliance (mL/cm H2O): 76 mL/cm H2O  Airway Resistance: 10.7       Breath Sounds: clear and equal bilaterally    7.5 @23 at teeth/gums  Emergency Ambu bag, mask and peep valve at bedside.         NOTE / SHIFT SUMMARY:     Today's Changes    Patient intubated in ED, one attempt by Dr. Palcaio. BVM utilized prior for preoxygenation. Height unknown. Report given to EMS. RT Antwon and RT Mich at bedside to assist with intubation.     Chest x-ray completed.       Ina Epps, RT    
2 seconds or less

## 2024-09-19 NOTE — CONSULTS
"[facility]     Stroke Code Note          History of Present Illness     Chief Complaint: Stroke Symptoms    Homer Joseph is a 89 year old with a PMH significant for recent stroke (April 15th 2024, multi focal right hemispheric stroke secondary to symptomatic right ICA 90% stenosis s/p right CEA), diabetes, dysphagia (had PEG tube until 3 weeks ago), hyperlipidemia, gout, chronic kidney disease. Difficulty obtaining review of systems and history due to patient's aphasia.  History obtained from ED provider. Homer was reportedly playing cards with friends at 11:10 am when he suddenly \"slumped over.\" Reportedly prior to that time he was asymptomatic. On initial ED providers examination, Homer had  right facial weakness, right sided weakness, and left gaze preference. CT head was negative for hemorrhage.  CTA head and neck revealed left M1 occlusion.  Neuroendovascular team was contacted immediately.  Elsa (patient's wife) was contacted regarding Homer's clinical presentation and TNK risks and benefits were discussed.  Elsa asked that the stroke team connect with either Jeri (her daughter) or Gerry (son-in-law) who are both anesthesiologist regarding consenting to TNK.  Attempted to call Jeri x 3 with no answer.  Was able to receive verbal consent to TNK from Gerry via telephone.  Elsa was then contacted again regarding Gerry verbal consent about TNK and she also verbally consented.  Blood pressure immediately prior to administration of TNK was 169/80 and 158/70.  TNK was administered at 12:41 PM. Presenting blood pressure was 234/110.  On Aspirin 325 mg and Lipitor 40 mg. Blood Glucose 169. INR: 1.10. PLTs: 316K. PT > 15 secs. aPTT: 26.    Reviewed exclusions:  - Heparin in last 24 hours? DOAC? History of warfarin use - No  - Severe head trauma within the last 3 months - No, though tripped and fell in the grass <7 days ago but no head strike, Elsa reported it was a \"soft " "fall\"  - Intracranial or spinal surgery within last 3 months - No  - History of intracranial hemorrhage - No   - Hx of GI bleeding - No   - Major surgery within 14 days - No  - Ischemic stroke in the last 3 months - No         Past Medical History     Stroke risk factors: Prior stroke, HTN, HLD, CKD    Preadmission antithrombotic regimen: Aspirin    Modified Unicoi Score (Pre-morbid)  2 - Slight disability.  Able to look after own affairs without assistance, but unable to carry out all previous activities.                 Assessment and Plan       Acute ischemic stroke of the left MCA region secondary to left M1 occlusion, s/p TNK, etiology currently under evaluation     Intravenous Thrombolysis  Risks (including potential for bleeding and death), benefits, and alternatives to thrombolytic therapy were discussed with Patient and Family. Prior to tenecteplase (TNK) administration, the following issues were addressed:   - hypertension requiring aggressive control with IV medications  - At the request of Homer's wife, required calling daughter and/or son-in-law regarding clinical situation and discuss consent for TNK and thrombectomy      Endovascular Treatment  Endovascular treatment initiated for Left M1 (MCA) segment occlusion     Plan:  -\"Emergency code 3 transfer\" to [facility] for thrombectomy, neuro IR notified  - Use orderset: \"Ischemic Stroke Post-Thrombolytics/Thrombectomy ICU Admission\"  - Neurochecks and vital signs per post-thrombolytic orders and monitor closely for any evidence of CNS hemorrhage, bleeding, or orolingual angioedema  - Goal  / 105   - Continue Nicardipine infusion as needed to maintain goal   - Hold all antithrombotic and anticoagulant medications for 24 hrs post-thrombolytic  - Hold pharmacologic VTE prophylaxis for 24 hrs post-thrombolytic  - Statin:  Pending high intensity statin   - Repeat Head CT 24 hrs post-thrombolytic  - MRI Brain with and without contrast   - TTE (with " "Bubble Study if age 60 yrs or less)  - Telemetry, EKG  - Bedside Glucose Monitoring  - A1c, Lipid Panel, Troponin x 3  - PT/OT/SLP  - Stroke Education  - Euthermia, Euglycemia  - Place 2 VERENICE and dominique     Patient case discussed with and patient seen with Vascular Neurology attending Dr. Aguila Pemberton PA-C  Vascular Neurology    To page me or covering stroke neurology team member, click here: AMCOM  Choose \"On Call\" tab at top, then select \"NEUROLOGY/ALL SITES\" from middle drop-down box, press Enter, then look for \"stroke\" or \"telestroke\" for your site.  _________________________________________________________________      Imaging/Labs   (personally reviewed CT and CTA head and neck )    HEAD CT:  1.  No evidence of acute hemorrhage or mass effect. No convincing parenchymal changes of acute territorial ischemia at this point in time. Consider MRI for further evaluation, as clinically appropriate.  2.  Multifocal chronic ischemic change, similar to prior.  3.  Age-related change.     HEAD CTA:   1.  Left M1 segment occlusion.  2.  Severe redemonstrated left P2 segment occlusion.     NECK CTA:  1.  Right carotid endarterectomy without residual flow-limiting stenosis based on NASCET criteria.  2.  Less than 50% left ICA stenosis.  3.  No flow-limiting vertebral artery stenosis.  4.  No evidence for dissection.         Physical Examination     BP: (!) 165/76   Pulse: 116   Resp: 22           SpO2: 96 %       Weight: 82.7 kg (182 lb 4.8 oz)    Wt Readings from Last 2 Encounters:   09/19/24 82.7 kg (182 lb 4.8 oz)   11/20/15 89.8 kg (198 lb)     General Exam  General:  patient lying in bed without any acute distress    HEENT:  normocephalic/atraumatic  Pulmonary:  no respiratory distress    Neuro Exam  Mental Status: not oriented to age or month, not following commands, no audible speech  Cranial Nerves: Decreased blink to threat bilaterally suggestive of right field cut (tested by nursing), forced left " gaze preference, unable to follow commands for formal extraocular eye movement testing, right facial weakness at rest   Motor: No abnormal movements, right upper extremity immediately falls to the bed when lifted by nursing, left upper extremity able to maintain antigravity without drift, left lower extremity able to maintain antigravity without drift, very briefly able to maintain right lower extremity to antigravity before drifting down to the bed, increased effort with right lower extremity elevation    Reflexes:  unable to test (telestroke)  Sensory: No response to noxious stimuli in the right upper extremity, withdraws to noxious in the left upper extremity, withdraws to noxious stimuli in the left lower extremity, flicker of movement with noxious in the right lower extremity   Coordination: Deferred  Station/Gait:  unable to test due to telestroke      Stroke Scales     NIHSS  1a. Level of Consciousness 1-->Not alert, but arousable by minor stimulation to obey, answer, or respond   1b. LOC Questions 2-->Answers neither question correctly   1c. LOC Commands 2-->Performs neither task correctly   2.   Best Gaze 2-->Forced deviation, or total gaze paresis not overcome by the oculocephalic maneuver   3.   Visual 2-->Complete hemianopia   4.   Facial Palsy 2-->Partial paralysis (total or near-total paralysis of lower face)   5a. Motor Arm, Left 0-->No drift, limb holds 90 (or 45) degrees for full 10 secs   5b. Motor Arm, Right 4-->No movement   6a. Motor Leg, Left 0-->No drift, leg holds 30 degree position for full 5 secs   6b. Motor Leg, right 2-->Some effort against gravity, leg falls to bed by 5 secs, but has some effort against gravity   7.   Limb Ataxia 0-->Absent   8.   Sensory 1-->Mild-to-moderate sensory loss, patient feels pinprick is less sharp or is dull on the affected side, or there is a loss of superficial pain with pinprick, but patient is aware of being touched   9.   Best Language 3-->Mute, global  aphasia, no usable speech or auditory comprehension   10. Dysarthria 2-->Severe dysarthria, patients speech is so slurred as to be unintelligible in the absence of or out of proportion to any dysphasia, or is mute/anarthric   11. Extinction and Inattention  2-->Profound tayler-inattention/extinction more than 1 modality   Total 25 (09/19/24 1214)          Labs     CBC  Lab Results   Component Value Date    HGB 9.9 (L) 09/19/2024    HCT 31.6 (L) 09/19/2024    WBC 6.4 09/19/2024     09/19/2024       BMP  Lab Results   Component Value Date     (L) 09/19/2024    POTASSIUM 5.5 (H) 09/19/2024    CHLORIDE 101 09/19/2024    CO2 22 09/19/2024    BUN 21.7 09/19/2024    CR 1.17 09/19/2024     (H) 09/19/2024    KENYA 9.1 09/19/2024       INR  INR   Date Value Ref Range Status   09/19/2024 1.10 0.85 - 1.15 Final       Data   Stroke Code Data  (for stroke code with tele)  Stroke code activated 09/19/24  1203   First stroke provider response 09/19/24  1206   Video start time 09/19/24  1211   Video end time 09/19/24  1244   Last known normal 09/19/24  1110   Time of discovery (or onset of symptoms)  09/19/24  1110   Head CT read by Stroke Neuro Provider 09/19/24  1212   Was stroke code de-escalated? No           Telestroke Service Details  Type of service telemedicine diagnostic assessment of acute neurological changes   Reason telemedicine is appropriate patient requires assessment with a specialist for diagnosis and treatment of neurological symptoms   Mode of transmission secure interactive audio and video communication per Zahraa   Originating site (patient location) [facility]    Distant site (provider location) [facility]        Clinically Significant Risk Factors Present on Admission        # Hyperkalemia: Highest K = 5.5 mmol/L in last 2 days, will monitor as appropriate  # Hyponatremia: Lowest Na = 133 mmol/L in last 2 days, will monitor as  appropriate          # Hypertension: Noted on problem list    # Anemia: based on hgb <11                    Time Spent on this Encounter   Billing: I personally examined and evaluated the patient today. At the time of my evaluation and management the patient was critical condition today due to Stroke. I personally managed stroke code. Key decisions made today included examination, review of images, needing TNK and thrombectomy, need for transfer, discussion with multiple family members. I spent a total of 70 minutes providing critical care services, evaluating the patient, directing care and reviewing laboratory values and radiologic reports.

## 2024-09-19 NOTE — ED TRIAGE NOTES
Previous stroke 3 months ago per EMS.    Patient was playing cards with friends at the [facility] and he slumped over. They called 911 right away. Was 170's systolic for EMS. On arrival patient is aphasic with right sided neglect and weakness. Not following commands. Patient attempts to speak but words and sounds are unintelligible. Went to CT right away. Was hypertensive at 234/102 there. Sinus tach at .     Patient's wife Lisa can be contacted at [phone]

## 2024-09-19 NOTE — ED NOTES
Bed: JNED-03  Expected date: 9/19/24  Expected time: 11:43 AM  Means of arrival: Ambulance  Comments:  [facility] 89 yo M stroke code

## 2024-09-20 NOTE — CONSULTS
Care Management Initial Consult    General Information  Assessment completed with: Spouse or significant other, Children, Elsa and Nicole  Type of CM/SW Visit: Initial Assessment    Primary Care Provider verified and updated as needed: Yes   Readmission within the last 30 days: no previous admission in last 30 days      Reason for Consult: other (see comments) (Stroke Consult)  Advance Care Planning: Advance Care Planning Reviewed:  (Unclear--family thinks it is either a POA or HCD or both?)          Communication Assessment  Patient's communication style: spoken language (English or Bilingual)             Cognitive  Cognitive/Neuro/Behavioral: .WDL except  Level of Consciousness: sedated  Arousal Level: arouses to voice  Orientation: other (see comments) (REFUGIO)  Mood/Behavior: other (see comments) (REFUGIO)  Best Language:  (REFUGIO)  Speech: endotracheal tube    Living Environment:   People in home: spouse  Elsa  Current living Arrangements: house      Able to return to prior arrangements:  (undetermined)       Family/Social Support:  Care provided by: self  Provides care for: no one  Marital Status:   Support system: Wife, Children  Elsa       Description of Support System: Supportive, Involved    Support Assessment: Adequate family and caregiver support    Current Resources:   Patient receiving home care services: No        Community Resources: None  Equipment currently used at home: cane, straight  Supplies currently used at home: None    Employment/Financial:  Employment Status: retired     Employment/ Comments: Family reports pt is a   Financial Concerns: none   Referral to Financial Worker: No       Does the patient's insurance plan have a 3 day qualifying hospital stay waiver?  No    Lifestyle & Psychosocial Needs:  Social Determinants of Health     Food Insecurity: No Food Insecurity (5/3/2024)    Received from [facility]    Hunger Vital Sign     Worried About Running Out of Food in  the Last Year: Never true     Ran Out of Food in the Last Year: Never true   Depression: Not at risk (4/17/2024)    Received from [facility]    PHQ-2     PHQ-2 Score: 0   Housing Stability: Low Risk  (5/3/2024)    Received from [facility]    Housing Stability     What is your living situation today?: I have a steady place to live   Tobacco Use: Medium Risk (6/26/2024)    Received from [facility]    Patient History     Smoking Tobacco Use: Former     Smokeless Tobacco Use: Never     Passive Exposure: Past   Financial Resource Strain: Low Risk  (6/8/2023)    [facility]    Financial Resource Strain     Difficulty of Paying Living Expenses: 3     Difficulty of Paying Living Expenses: Not on file   Alcohol Use: Not At Risk (3/5/2023)    Received from [facility]    AUDIT-C     Frequency of Alcohol Consumption: Never     Average Number of Drinks: Not on file     Frequency of Binge Drinking: Not on file   Transportation Needs: No Transportation Needs (5/3/2024)    Received from [facility]    PRAPARE - Transportation     Lack of Transportation (Medical): No     Lack of Transportation (Non-Medical): No   Physical Activity: Insufficiently Active (5/3/2024)    Received from [facility]    Exercise Vital Sign     Days of Exercise per Week: 3 days     Minutes of Exercise per Session: 20 min   Interpersonal Safety: Not At Risk (4/28/2024)    Received from [facility]    Humiliation, Afraid, Rape, and Kick questionnaire     Fear of Current or Ex-Partner: No     Emotionally Abused: No     Physically Abused: No     Sexually Abused: No   Stress: No Stress Concern Present (3/5/2023)    Received from [facility]    [employer] [place] [employer] - Occupational Stress Questionnaire     Feeling of Stress : Not at all   Social Connections: Unknown (6/9/2024)    Received from [facility]    Social Connections     Frequency of Communication with  "Friends and Family: Not on file   Health Literacy: Not on file       Functional Status:  Prior to admission patient needed assistance:   Dependent ADLs:: Independent, Ambulation-cane  Dependent IADLs:: Independent       Mental Health Status:  Mental Health Status: No Current Concerns       Chemical Dependency Status:  Chemical Dependency Status: No Current Concerns             Values/Beliefs:  Spiritual, Cultural Beliefs, Shinto Practices, Values that affect care: yes  Description of Beliefs that Will Affect Care: Judaism    Cultural/Shinto Practices Patient Routinely Participates In:  (Anglican)       Discussed  Partnership in Safe Discharge Planning  document with patient/family: No    Additional Information:   automatic stroke consult. Reviewed chart and met w/ pt's dtr, Nicole, and wife, Elsa, at bedside. Pt admitted 9/19 with acute ischemic stroke. Pt with hx of 4/15/2024 and treated at [facility] at that time. Pt was able to go home w/ a PEG and Home Care. PEG was removed about 3 weeks ago.     Pt lives at home w/ his wife, Elsa, in [place]. Pt and wife live in a multi-level home but all needs can be met on the main floor. There are 3-4 stairs to enter.Pt was independent w/ ADLs, IADLs, ambulation and driving. Pt's family reports he was able to return home and the first month was challenging, but then pt did very well. Pt's family reports pt was walking about 1 mile around his neighborhood everyday and enjoyed playing cards with his friends.Family does report pt has had about four falls in the home and out of the home. Pt would not use a walker \"because it was for old people\" per dtr, but used a cane. Pt did not have any home care. Pt has an excellent support system that includes his dtr, son, wife and extended family.     Met w/ pt's family with premise to get a psychosocial assessment completed. Discussion progressed to goals of care and that pt would not want any further medical " interventions. With previous stroke, family has good understanding on what pt would and would not want. Neuro Crit came in room in midst of these conversations, and provided medical update on severity of the stroke and anticipated deficits. This further reinforced to pt's family that anticipated deficits would not be the quality of life pt would want. Plan was for extubation, with no plan to reintubate. Pt was extubated and appeared much more comfortable. Pt's wife and extended family all in agreement w/ pursuing home hospice w/ 24hr care. Reviewed hospice agencies and writer recommended [facility] due to writer's experience in their willingness to get pt's home quickly and care they have provided to pt's and family. In speaking to [facility], they recommended [facility] ([phone]).     Referral initiated to [facility] (ph: [phone] 24/7 line) and also spoke to our hospital liaison, Jaimie White, whom will give report to weekend staff. Writer left vm with [facility], but likely they were out of the office due to the time of day writer contacted them. [facility] has staffing to open pt over the weekend. The question will be is family ok with discharging home w/ hospice and providing the care pt will need and work on obtaining 24hr caregiver support independently? Writer had presented this scenario and family felt they would be able to provide 24hr care at home for a short time if there was an an agency that couldn't open until next week. However, unsure how they feel about this scenario when we don't even have an agency that has given a timeline as to when they can start.     Discharge planning w/ hospice discussed with Neuro Critical Care and they are in agreement w/ plan.     Next Steps: Will ask weekend  to continue to work on discharge plan home with hospice. Writer helped manage expectations that this could take a few days. Again, family  is very motivated to get pt home.       FARIDA Park, LICSW   4A/4E ICU   Phone: [phone]  Available via netFactor

## 2024-09-20 NOTE — PHARMACY-CONSULT NOTE
Pharmacy Tube Feeding Consult    Medication reviewed for administration by feeding tube and for potential food/drug interactions.    Recommendation: No changes are needed at this time.     Pharmacy will continue to follow as new medications are ordered.    Leanne Mota, FlexD, BCCCP

## 2024-09-20 NOTE — PLAN OF CARE
SLP: Clinical swallow eval orders received. Per RN, pt remains intubated but may extubate later this afternoon. Will hold and follow-up as appropriate.

## 2024-09-20 NOTE — PROCEDURES
"Small Bowel Feeding Tube Placement Assessment  Reason for Feeding Tube Placement: Provider request for small bore nasoFT  Cortrak Start Time: 10:00   Cortrak End Time: 10:20  Medicine Delivered During Procedure: Lidocaine   Placement Successful: Presume gastric or just post-pyloric (pending AXR confirmation).  Procedure Complications: NA  Final Placement Champ at exit of nare: 79 cm  Face to Face time with patient: 20 minutes     Taye Kitchen RD, ANNA, [facility]  Neuro ICU Dietitian; 6A Neuro  Vocera \"4E Clinical Dietitian\"  Weekend/holiday \"Weekend Clinical Dietitian\"      Bridle Placement:   Reason for bridle placement: securement of FT   Medicine delivered during procedure: lubricating jelly   Procedure: Successful  Location of top of clip on FT: @ 80 cm marker   Condition of nose/skin at time of bridle placement: Unremarkable   Face to Face time with patient: 5 minutes.    Taye Kitchen RD, ANNA, [facility]  Neuro ICU Dietitian; 6A Neuro  Vocera \"4E Clinical Dietitian\"  Weekend/holiday \"Weekend Clinical Dietitian\"            "

## 2024-09-20 NOTE — PLAN OF CARE
Problem: Adult Inpatient Plan of Care  Goal: Plan of Care Review  Description: The Plan of Care Review/Shift note should be completed every shift.  The Outcome Evaluation is a brief statement about your assessment that the patient is improving, declining, or no change.  This information will be displayed automatically on your shift  note.  Flowsheets (Taken 9/20/2024 2322)  Outcome Evaluation: Psychosocial Assessment completed w/ pt's wife and dtrNicole.  Plan of Care Reviewed With:   spouse   child  Overall Patient Progress: no change     FARIDA Park, LICSW   4A/4E ICU   Phone: [phone]  Available via Pressi

## 2024-09-20 NOTE — PLAN OF CARE
Major Shift Events:  No acute events overnight. Q1hr neuro exams. Pupils are equal and reactive, follows simple commands the left side, only withdrawing on the right. Left cheryl in place for patient safety. NSR, HR 80s, systolic goal <160, tmax 101.4, no edema. R groin site WDL. CMV 30%/400/14/5, minimal secretions. Hazel with adequate urine output, no BM, OG placed. Multiple hypoglycemic episodes, D5 NS @ 50 started. Prop at 15 mcg. PIV x3. Generalized bruising. Q2hr turns.   Plan: continue neuro and CMS checks. Notify team with any changes.   For vital signs and complete assessments, please see documentation flowsheets.     Problem: Stroke, Ischemic (Includes Transient Ischemic Attack)  Goal: Optimal Cognitive Function  Outcome: Not Progressing  Intervention: Optimize Cognitive Function  Recent Flowsheet Documentation  Taken 9/20/2024 0400 by Ruth Mcgrath RN  Sensory Stimulation Regulation: care clustered  Reorientation Measures:   familiar social contact encouraged   reorientation provided  Taken 9/20/2024 0000 by Ruth Mcgrath RN  Sensory Stimulation Regulation: care clustered  Reorientation Measures:   familiar social contact encouraged   reorientation provided  Taken 9/19/2024 2000 by Ruth Mcgrath RN  Sensory Stimulation Regulation: care clustered  Reorientation Measures:   familiar social contact encouraged   reorientation provided   Goal Outcome Evaluation:                      Ruth Mcgrath RN on 9/20/2024 at 6:10 AM

## 2024-09-20 NOTE — PROGRESS NOTES
CLINICAL NUTRITION SERVICES - ASSESSMENT NOTE     Nutrition Prescription    RECOMMENDATIONS FOR MDs/PROVIDERS TO ORDER:  - Total daily fluids/adjustments per MD  - Electrolyte replacement per protocol as indicated     Malnutrition Status:    - Moderate malnutrition in the context of chronic illness     Recommendations already ordered by Registered Dietitian (RD):  - Once enteral access placed and verified for use: Pivot 1.5 Arpan (or equivalent) @ goal of  60ml/hr  (1440ml/day) provides: 2160 kcals (26 kcals/kg), 135 g PRO (1.6 gm/kg), 1080 ml free H20, 248 g CHO, and 10 g fiber daily.    - Initiate @ 10 ml/hr and advance by 10 ml q8hr as tolerated  - Do not start or advance until lytes (Mg++,K+) WNL and phos>2.0  - Recommend 30-60 ml q4hr fluid flushes for tube patency. Additional fluids and/or adjustments per MD.    - Order multivitamin/mineral (15 ml/day via FT) to help ensure micronutrient needs being met with suspected hypermetabolic demands and potential interruptions to TF infusions.  - Elevated HOB if gastric feeds     Future/Additional Recommendations:  - FT position   - EN initiation/tolerance via N_T (Cortrak/ pending)  - Met cart as appro     REASON FOR ASSESSMENT  Homer Joseph is a/an 89 year old male assessed by the dietitian for Provider Order - Registered Dietitian to Assess and Order TF per Medical Nutrition Therapy Protocol + feeding tube placement     Medical History:  PMH significant for recent stroke, DM, dysphagia (had PEG tube until 3 weeks ago), HLD, gout, CKD. Patient was transferred to [facility] on 9/19/2024 for thrombectomy. DSA revealed a left M1 occlusion.     NUTRITION HISTORY  H/o dysphagia and PEG. Per family, pt had PEG for a while but eventually passes a swallow study, advanced diet, and FT was removed. Pt was doing well with PO per family. No reported nutrition or weight loss concerns.     CURRENT NUTRITION ORDERS  Diet: NPO  Enteral access: pending small bore placement     LABS  Labs  "reviewed  Mg++ 1.6 (L)  A1C: 8.2 (H)  Glucose: 128    MEDICATIONS  Medications reviewed  Insulin   Miralax  Senokot   Propofol   D5 NS @ 25 ml/hr     ANTHROPOMETRICS  Height: 177.8 cm (5' 10\") 70\"   Most Recent Weight: 83 kg (182 lb 15.7 oz)    IBW: 75 kg  BMI: Overweight BMI 25-29.9   Weight History:   Wt Readings from Last 7 Encounters:   09/20/24 83 kg (182 lb 15.7 oz)   09/19/24 82.7 kg (182 lb 4.8 oz)   11/20/15 89.8 kg (198 lb)     Dosing Weight: 83 kg (current weight)    ASSESSED NUTRITION NEEDS  Estimated Energy Needs: 0489-7574 kcals/day (25 - 30 kcals/kg)  Justification: Maintenance  Estimated Protein Needs: 125-166 grams protein/day (1.5 - 2 grams of pro/kg)  Justification: Increased needs  Estimated Fluid Needs: (1 mL/kcal)   Justification: Maintenance and Per provider pending fluid status    PHYSICAL FINDINGS  See malnutrition section below.  Appears depleted of LBM, though could be partially c/w age  Skin: fragile   GI: abd soft, thin  Hair: WNL      MALNUTRITION  % Intake: Decreased intake does not meet criteria - per family eating well PTA and after FT removal  % Weight Loss: None noted per family  Subcutaneous Fat Loss: Upper arm: mild and Thoracic/intercostal: mild  Muscle Loss: Thoracic region (clavicle, acromium bone, deltoid, trapezius, pectoral):, Upper arm (bicep, tricep), Lower arm  (forearm):, Upper leg (quadricep, hamstring):, Patellar region, and Posterior calf: mild at minimal; some depletion could be c/w age  Fluid Accumulation/Edema: None noted  Malnutrition Diagnosis: Moderate malnutrition in the context of chronic illness     NUTRITION DIAGNOSIS  Inadequate oral intake related to inability to take oral PO/vented as evidenced by NPO and meeting 0% nutrition needs currently       INTERVENTIONS  Implementation  Nutrition Education: Not appropriate at this time due to patient condition   Enteral Nutrition - Initiate as ordered     Goals  Total avg nutritional intake to meet a minimum of " "25 kcal/kg and 1.5 g PRO/kg daily (per dosing wt 83 kg).     Monitoring/Evaluation  Progress toward goals will be monitored and evaluated per protocol.  Taye Kitchen RD, LD, [facility]  Neuro ICU Dietitian; 6A Neuro  Vocera \"4E Clinical Dietitian\"  Weekend/holiday \"Weekend Clinical Dietitian\"      "

## 2024-09-20 NOTE — PLAN OF CARE
Major Shift Events:    Problem: Stroke, Ischemic (Includes Transient Ischemic Attack)  Goal: Optimal Cerebral Tissue Perfusion  Outcome: Progressing  Intervention: Protect and Optimize Cerebral Perfusion  Recent Flowsheet Documentation  Taken 9/19/2024 1600 by Micheline Yin RN  Sensory Stimulation Regulation: care clustered  Neuro: Currently on 30 min checks. Following commands in BLE's. No movement in RUE. RLE triple flexion. PERRL. Mitt in place for pulling at ETT.   CV: SB-SR. SBP<160 w/ PRN labetalol and hydralazine.   Pulm: PS trial failed. Switched to CMV 30%, peep 5.   GI/: 1 episode of emesis. NCC ok with no feeding tube overnight. Hazel in place w/ adequate OP.  Plan: Continue to monitor neuro status closely.  Admitted/transferred from: IR  Reason for admission/transfer: post thrombectomy  2 RN skin assessment: completed by kim  Result of skin assessment and interventions/actions: bruising generalized  Height, weight, drug calc weight: Not Done  Patient belongings (see Flowsheet)  MDRO education added to care planNo  For vital signs and complete assessments, please see documentation flowsheets.

## 2024-09-20 NOTE — CONSULTS
SPIRITUAL HEALTH SERVICES Consult Note  [facility] 4E     Summary: Attempted visit with patient Homer Joseph and family; however, family had left for the evening and Homer was resting. I spoke with RN, who shared plan for Homer to discharge with hospice if possible and noted daughter's concerns for support for wife Yojana. Homer and Elsa are Congregational.     Plan: Chaplains will follow for spiritual support while Homer is admitted.     Aliyah Reyes M.Div., [facility]  Staff   Pager [phone]     * Spiritual Health Services remains available 24/7 for emergent requests/referrals, either by having the switchboard page the on-call  or by entering an ASAP/STAT consult in Epic (this will also page the on-call ). Routine Epic consults receive an initial response within 24 hours.*

## 2024-09-20 NOTE — PHARMACY-CONSULT NOTE
Pharmacy Consult to evaluate for medication related stroke core measures    Homer Joseph, 89 year old male admitted for ischemic stroke on 9/19/2024.    Thrombolytic was given on 9/19/24 at 12:40pm.    VTE Prophylaxis SCDs /PCDs placed on 9/19/24, as appropriate prior to end of hospital day 2.    Antithrombotic: aspirin started on 9/20/24, as appropriate by end of hospital day 2. Continue antithrombotic therapy on discharge to meet quality measures, unless contraindicated.    Anticoagulation if history of A-fib/flutter: Patient does not have history of A-fib/flutter - anticoagulation not required for medication related stroke core measures.     LDL Cholesterol Calculated   Date Value Ref Range Status   09/20/2024 38 <100 mg/dL Final       Home atorvastatin on hold due to LDL < 40mg/dL and concern for stroke at high risk for hemorrhagic transformation.     Recommendations: None at this time    Thank you for the consult.    Leanne Mota RP 9/20/2024 3:22 PM

## 2024-09-20 NOTE — PHARMACY-ADMISSION MEDICATION HISTORY
Pharmacist Admission Medication History    Admission medication history is complete. The information provided in this note is only as accurate as the sources available at the time of the update.    Information Source(s): Family member, Clinic records, and CareEverywhere/SureScripts via phone    Pertinent Information: Spouse (Elsa) reported leaving medication list in patient room for me to verify instead of going over medications on the phone. Confirmed patient took all morning medications today and confirmed insulin glargine dosing. List identified in patient room included medications from April 2024 that have since been changed. Utilized fill records and [facility] records where patient receives majority of care to update list.     Changes made to PTA medication list:  Added:   Atorvastatin  Gabapentin  Humalog  Metformin  Tamsulosin  Melatonin  Aspirin  Deleted:   Tylenol PM  Glipizide  Lovastatin  Changed:   Insulin glargine from 25 units twice daily to 30 units every morning  Lisinopril from 5 mg daily to 20 mg daily    Allergies reviewed with patient and updates made in EHR: unable to assess    Medication History Completed By: Shyanne Hdz, PharmD 9/19/2024 8:54 PM    PTA Med List   Medication Sig Last Dose    allopurinol (ZYLOPRIM) 100 MG tablet [ALLOPURINOL (ZYLOPRIM) 100 MG TABLET] Take 100 mg by mouth daily. 9/19/2024 at am    aspirin (ASA) 325 MG EC tablet Take 325 mg by mouth daily. 9/19/2024 at am    atorvastatin (LIPITOR) 40 MG tablet Take 40 mg by mouth daily. 9/19/2024 at am    gabapentin (NEURONTIN) 100 MG capsule Take 200 mg by mouth 2 times daily. 9/19/2024 at am    insulin glargine (LANTUS) 100 unit/mL injection Inject 30 Units subcutaneously every morning. 9/19/2024 at am    insulin lispro (HUMALOG KWIKPEN) 100 UNIT/ML (1 unit dial) KWIKPEN Inject 0-6 Units subcutaneously at bedtime. For blood glucose: 220-259= +2 units; 260-299= +3 units; 300-339= +4 units; 340-379= +5 units; 380-399=  +6 units Past Week at prn    lisinopril (ZESTRIL) 20 MG tablet Take 20 mg by mouth daily. 9/19/2024 at am    loperamide (IMODIUM) 2 MG capsule Take 2 mg by mouth daily as needed for diarrhea. Unknown at prn    melatonin 5 MG tablet Take 5 mg by mouth nightly as needed for sleep. May take an additional tablet if needed Unknown at prn    metFORMIN (GLUCOPHAGE XR) 500 MG 24 hr tablet Take 500 mg by mouth 2 times daily (with meals). 9/19/2024 at am    omeprazole (PRILOSEC) 20 MG capsule [OMEPRAZOLE (PRILOSEC) 20 MG CAPSULE] Take 20 mg by mouth daily. 9/19/2024 at am    tamsulosin (FLOMAX) 0.4 MG capsule Take 0.4 mg by mouth daily. 9/19/2024 at am

## 2024-09-20 NOTE — PROVIDER NOTIFICATION
Pt extubated to 2l/m NC.   09/20/24 1243   Weaning Assessment   RASS (Castaneda Agitation-Sedation Scale) 0-->alert and calm   Pulse 67   /47   Spontaneous Respiratory Rate 22 breaths/min   Spontaneous Tidal Volume (mL) 336 mL   Spontaneous Minute Ventilation 7.66 mL   RSBI 65.48   Weaning Assessment Complete Y   Safety Screen Weaning Assessment Weaning Assessment meets all criteria   Wean Start Time  1240  (5/5)   $Weaning Trial Charge Yes   Wean End Time  1430   Wean Time Calculated (min) 110   $Extubation / Decannulation  Yes

## 2024-09-20 NOTE — PROGRESS NOTES
"Neurocritical Care Progress Note    Reason for critical care admission: Acute Ischemic Stroke   Admitting Team: DAT  Date of Service:  09/20/2024  Date of Admission:  9/19/2024  Hospital Day: 2    Assessment/Plan  Homer Joseph is a 89yo with a PMH significant for recent stroke (4/15/2024 R hemispheric 2/2 right ICA 90% stenosis s/p right CEA), DM, dysphagia (had PEG tube until 3 weeks ago), HLD, gout, CKD. He was playing cards with friends at 11:10 am when he suddenly \"slumped over.\" Reportedly prior to that time he was asymptomatic. On initial ED exam - R-facial weakness, R-sided weakness, and L-gaze preference. CTH negative for hemorrhage.  CTA H&N revealed L M1 occlusion. BP immediately prior to administration of TNK was 169/80 and 158/70. TNK was administered at 12:41 PM. Presenting BP was 234/110. Patient was transferred to [facility] on 9/19/2024 for thrombectomy. DSA revealed a left M1 occlusion and it was treated with a total of 2 passes with aspiration only for 1 pass and 1 pass with stent retriever and aspiration with TICI 3 recanalization.     Neuro  #Left MCA (M1 occlusion) Ischemic Stroke, ESUS  #S/p mechanical thrombectomy with TICI 3 recanalization   #Prior Stroke 4/15/2024 s/p MUKUND CEA  -Neurochecks every 1 hrs per post-thrombectomy protocol  -SBP goal 100-160 mmHg  -MRI Brain wwo contrast   -HOB > 30   -PT/OT/SLP as indicated      #Analgesics & sedation  RASS goal: 0 to -1  -Tylenol 650 mg Q4h PRN  -Stop Propofol infusion after MR brain     CV  #HTN  #HLD  -Cardiac monitoring  -SBP goal 100-160 mmHg  -Holding PTA Lisinopril 5 mg daily  -PRN Labetalol and Hydralazine  -9/20 LDL: 38 - Hold PTA Atorvastatin 40 mg   -TTE today     Resp  #Intubated for airway protection for procedure  Oxygen/vent: AC 14/400/+5  -PST as able, assess for extubation  -CXR this am    -Continuous pulse ox  -Maintain O2 saturations greater than 92%      GI  Diet: NPO  -OGT in place   -Plan for small bore in the am  -Consult " dietician for placement and TF's    Last BM: PTA  GI prophylaxis: Pantoprazole  -Bowel regimen: Scheduled Senna-docusate BID and Miralax daily      Renal/  #CKD Stage 2  #Gout  #Hypomagnesemia   -Daily BMP  -IV fluids: Decrease D5NS to 25 ml/hr  -Electrolyte replacement protocol     Endo  #T2DM  #Hypoglycemia   -9/20 Hgb A1c: 8.2   -9/20 TSH: 1.04   -Hold PTA Glipzide 10 mg daily  -Hold Lantus 25 U bid  -High intensity sliding scale insulin  -Monitor glucose levels      Heme  #Microcytic Anemia likely 2/2 chronic disease  #Drug induced platelet defect  #Coagulation defect    -Daily CBC  -Hgb goal >7, plt goal >50k  -Transfuse to meet Hgb and plt goals     ID  #Fevers  #Possible dental abscesses   -Hold Tylenol for now  -Pan culture if fevers > 101.0  -Obtain BC x1  -Consider CT dental this admission     -Daily CBC  -Follow temperature curve     ICU Checklist  Lines/tubes/drains: PIV x3, ETT, OGT, dominique   FEN: NPO, D5NS, replacement protocol   PPX: DVT - SCDs; GI - Pantoprazole.  Code: Full code   Dispo: ICU - NCC     I spent 35 minutes of critical care time on the unit/floor managing the care of Homer Joseph excluding time performing procedures. Upon evaluation, this patient had a high probability of imminent or life-threatening deterioration due to L MCA stroke s/p TNK & thrombectomy, which required my direct attention, intervention, and personal management. Greater than 50% of my time was spent at the bedside counseling the patient and/or coordinating care including chart review, history, exam, documentation, and further activities per this note. I have personally reviewed the following data/imaging over the past 24 hours.      The patient was seen and discussed with the NCC attending, Dr. Sterling Abreu.     Ritika TAY, CNP  Neurocritical care nurse practitioner  Ascom: [phone] available M-[facility] 0700 to 1900       24 Hour Vital Signs Summary:  Temp: (!) 101.4  F (38.6  C) Temp  Min: 96  F (35.6  C)  Max:  101.4  F (38.6  C)  Resp: 14 Resp  Min: 14  Max: 27  SpO2: 98 % SpO2  Min: 93 %  Max: 100 %  Pulse: 72 Pulse  Min: 52  Max: 149  BP: 111/49 Systolic (24hrs), Av , Min:78 , Max:236   Diastolic (24hrs), Av, Min:49, Max:118    Respiratory monitoring:   FiO2 (%): 30 %, Resp: 14, Vent Mode: CMV/AC, Resp Rate (Set): 14 breaths/min, Tidal Volume (Set, mL): 400 mL, PEEP (cm H2O): 5 cmH2O, Pressure Support (cm H2O): 5 cmH2O, Resp Rate (Set): 14 breaths/min, Tidal Volume (Set, mL): 400 mL, PEEP (cm H2O): 5 cmH2O    I/O last 3 completed shifts:  In: 523.42 [I.V.:523.42]  Out: 1405 [Urine:1405]    Current Medications:  Current Facility-Administered Medications   Medication Dose Route Frequency Provider Last Rate Last Admin    insulin aspart (NovoLOG) injection (RAPID ACTING)  1-12 Units Subcutaneous Q4H Ritika Smart CNP        pantoprazole (PROTONIX) IV push injection 40 mg  40 mg Intravenous Daily Ritika Smart CNP        polyethylene glycol (MIRALAX) Packet 17 g  17 g Oral or NG Tube Daily Ritika Smart CNP        senna-docusate (SENOKOT-S/PERICOLACE) 8.6-50 MG per tablet 1-2 tablet  1-2 tablet Oral or NG Tube BID Ritika Smart CNP        sodium chloride (PF) 0.9% PF flush 3 mL  3 mL Intracatheter Q8H Ritika Smart CNP           PRN Medications:  Current Facility-Administered Medications   Medication Dose Route Frequency Provider Last Rate Last Admin    acetaminophen (TYLENOL) tablet 650 mg  650 mg Oral or Feeding Tube Q4H PRN Ritika Smart CNP        dextrose 10% infusion   Intravenous Continuous PRN Diallo Patterson MD        glucose gel 15-30 g  15-30 g Oral Q15 Min PRN Ritika Smart CNP        Or    dextrose 50 % injection 25-50 mL  25-50 mL Intravenous Q15 Min PRN Ritika Smart CNP   25 mL at 24 0117    Or    glucagon injection 1 mg  1 mg Subcutaneous Q15 Min PRN Ritika mSart CNP        hydrALAZINE (APRESOLINE) injection 10-20 mg  10-20 mg Intravenous Q30 Min PRN  Ritika Smart CNP   10 mg at 09/19/24 1826    labetalol (NORMODYNE/TRANDATE) injection 10-20 mg  10-20 mg Intravenous Q15 Min PRN Ritika Smart CNP   10 mg at 09/20/24 0408    lidocaine (LMX4) cream   Topical Q1H PRN Ritika Smart CNP        lidocaine 1 % 0.1-1 mL  0.1-1 mL Other Q1H PRN Ritika Smart CNP        Medication Instruction - Avoid dextrose in IV solutions   Intravenous Continuous PRN Ritika Smart CNP        ondansetron (ZOFRAN) injection 4 mg  4 mg Intravenous Q6H PRN Ritika Smart CNP   4 mg at 09/19/24 1835    prochlorperazine (COMPAZINE) injection 5 mg  5 mg Intravenous Q6H PRN Ritika Smart CNP        sodium chloride (PF) 0.9% PF flush 3 mL  3 mL Intracatheter q1 min prn Ritika Smart CNP           Infusions:  Current Facility-Administered Medications   Medication Dose Route Frequency Provider Last Rate Last Admin    dextrose 10% infusion   Intravenous Continuous PRN Diallo Patterson MD        dextrose 5% and 0.9% NaCl infusion   Intravenous Continuous Diallo Patterson MD 50 mL/hr at 09/20/24 0700 Rate Verify at 09/20/24 0700    Medication Instruction - Avoid dextrose in IV solutions   Intravenous Continuous PRN Ritika Smart CNP        propofol (DIPRIVAN) infusion  5-30 mcg/kg/min (Dosing Weight) Intravenous Continuous Sterling Abreu MD 7.4 mL/hr at 09/20/24 0700 15 mcg/kg/min at 09/20/24 0700       Allergies   Allergen Reactions    Sulfa (Sulfonamide Antibiotics) [Sulfa Antibiotics] Hives     And itching in hands and feet       Physical Examination:  Vitals: /49   Pulse 72   Temp (!) 101.4  F (38.6  C) (Axillary)   Resp 14   Wt 83 kg (182 lb 15.7 oz)   SpO2 98%   BMI 26.26 kg/m    General: Adult male patient, lying in bed  HEENT: Normocephalic, atraumatic, no icterus  Cardiac: Sinus rhythm on bedside monitor   Pulm: Mechanically ventilated, comfortable, expansion symmetric, no retractions or use of accessory muscles  Abdomen: Soft,  non-distended abdomen   Extremities: Warm, no edema, appears adequately perfused  Skin: No rash or lesion observed on exposed skin, groin w/o hematoma  Psych: Calm  Neuro:   Mental status: Sedated, opens eyes briefly to voice but quickly closes them, does not track, does not follow commands, intubated.   Cranial nerves: PERRL, conjugate gaze, strong cough and gag present with deep suction.   Motor: Normal bulk and tone. No abnormal movements. 4/5 strength in LUE, LLE with spontaneous purposeful movement. 0/5 strength in RUE with no active withdrawal to noxious stimuli. 1/5 strength in RLE with triple flexion to noxious stimuli.     Sensory: Grimaces, withdraws briskly on LUE, LLE. No withdrawal in RUE but will does respond to painful stimuli by reaching toward stimulus with left arm.  Coordination: REFUGIO, deferred.   Gait: REFUGIO, deferred.    Labs and Imaging:    Results for orders placed or performed during the hospital encounter of 09/19/24 (from the past 24 hour(s))   CT Head w/o Contrast    Narrative    CT HEAD W/O CONTRAST 9/19/2024 3:32 PM    Provided History: post thrombectomy dual energy    Comparison: None.    Technique: Using multidetector thin collimation helical acquisition  technique, axial, coronal and sagittal CT images from the skull base  to the vertex were obtained without intravenous contrast.     Findings:      Chronic infarctions of the biparietal lobes and right occipital  chronic. Chronic-appearing juxtacortical and white matter infarctions  of the right frontal lobe. No evidence of intracranial hemorrhage,  mass effect, or midline shift. No acute loss of gray-white  differentiation. Diffuse mild-to-moderate cerebral parenchymal volume  loss with associated ventriculomegaly.    Ventricles are proportionate to the cerebral sulci. Clear basal  cisterns.    Intact bony calvaria and skull base. Mastoid air cells are clear.  Scattered paranasal sinus mucosal thickening/debris.      Impression     Impression:   No evidence for hemorrhage following thrombectomy.    I have personally reviewed the examination and initial interpretation  and I agree with the findings.    YONAS YAN MD         SYSTEM ID:  [id number]   Glucose by meter   Result Value Ref Range    GLUCOSE BY METER POCT 95 70 - 99 mg/dL   Basic metabolic panel   Result Value Ref Range    Sodium 136 135 - 145 mmol/L    Potassium 4.3 3.4 - 5.3 mmol/L    Chloride 106 98 - 107 mmol/L    Carbon Dioxide (CO2) 20 (L) 22 - 29 mmol/L    Anion Gap 10 7 - 15 mmol/L    Urea Nitrogen 20.1 8.0 - 23.0 mg/dL    Creatinine 1.01 0.67 - 1.17 mg/dL    GFR Estimate 72 >60 mL/min/1.73m2    Calcium 8.4 (L) 8.8 - 10.4 mg/dL    Glucose 84 70 - 99 mg/dL   XR Chest Port 1 View    Narrative    Portable chest    INDICATION: Verify endotracheal tube placement    COMPARISON: None    FINDINGS: Heart size normal. Atherosclerotic ectasia of the aortic  knob. Endotracheal tube is present with tip approximately 4 cm above  the karri. Azygous fissure incidentally noted in the right upper  line.       Impression    IMPRESSION: Endotracheal tube approximate 4 cm above the karri.  Atherosclerosis in the aorta.    ABNER RED MD         SYSTEM ID:  [id number]   Troponin T, High Sensitivity   Result Value Ref Range    Troponin T, High Sensitivity 21 <=22 ng/L   Blood gas venous   Result Value Ref Range    pH Venous 7.33 7.32 - 7.43    pCO2 Venous 46 40 - 50 mm Hg    pO2 Venous 40 25 - 47 mm Hg    Bicarbonate Venous 24 21 - 28 mmol/L    Base Excess/Deficit Venous -1.8 -3.0 - 3.0 mmol/L    FIO2 30     Oxyhemoglobin Venous 65 (L) 70 - 75 %    O2 Sat, Venous 66.7 (L) 70.0 - 75.0 %    Narrative    In healthy individuals, oxyhemoglobin (O2Hb) and oxygen saturation (SO2) are approximately equal. In the presence of dyshemoglobins, oxyhemoglobin can be considerably lower than oxygen saturation.   Glucose by meter   Result Value Ref Range    GLUCOSE BY METER POCT 63 (L) 70 - 99 mg/dL    Glucose by meter   Result Value Ref Range    GLUCOSE BY METER POCT 120 (H) 70 - 99 mg/dL   Troponin T, High Sensitivity   Result Value Ref Range    Troponin T, High Sensitivity 25 (H) <=22 ng/L   Glucose by meter   Result Value Ref Range    GLUCOSE BY METER POCT 52 (L) 70 - 99 mg/dL   Glucose by meter   Result Value Ref Range    GLUCOSE BY METER POCT 65 (L) 70 - 99 mg/dL   Glucose by meter   Result Value Ref Range    GLUCOSE BY METER POCT 129 (H) 70 - 99 mg/dL   Glucose by meter   Result Value Ref Range    GLUCOSE BY METER POCT 101 (H) 70 - 99 mg/dL   XR Abdomen Port 1 View    Narrative    EXAM: XR ABDOMEN PORT 1 VIEW  9/20/2024 5:40 AM      HISTORY: Post NG    COMPARISON: None.    FINDINGS:   Single AP view of the abdomen. Enteric tube terminates over the  stomach.    Nonspecific paucity of bowel gas. No pneumatosis. No portal venous  gas. Unremarkable lung bases.       Impression    IMPRESSION:  Enteric tube terminates over the stomach..    I have personally reviewed the examination and initial interpretation  and I agree with the findings.    FARHAN BENTON MD         SYSTEM ID:  [id number]   Hemoglobin A1c   Result Value Ref Range    Estimated Average Glucose 189 (H) <117 mg/dL    Hemoglobin A1C 8.2 (H) <5.7 %   Lipid panel reflex to direct LDL   Result Value Ref Range    Cholesterol 100 <200 mg/dL    Triglycerides 112 <150 mg/dL    Direct Measure HDL 40 >=40 mg/dL    LDL Cholesterol Calculated 38 <100 mg/dL    Non HDL Cholesterol 60 <130 mg/dL    Narrative    Cholesterol  Desirable: < 200 mg/dL  Borderline High: 200 - 239 mg/dL  High: >= 240 mg/dL    Triglycerides  Normal: < 150 mg/dL  Borderline High: 150 - 199 mg/dL  High: 200-499 mg/dL  Very High: >= 500 mg/dL    Direct Measure HDL  Female: >= 50 mg/dL   Male: >= 40 mg/dL    LDL Cholesterol  Desirable: < 100 mg/dL  Above Desirable: 100 - 129 mg/dL   Borderline High: 130 - 159 mg/dL   High:  160 - 189 mg/dL   Very High: >= 190 mg/dL    Non HDL  Cholesterol  Desirable: < 130 mg/dL  Above Desirable: 130 - 159 mg/dL  Borderline High: 160 - 189 mg/dL  High: 190 - 219 mg/dL  Very High: >= 220 mg/dL   CBC with platelets   Result Value Ref Range    WBC Count 10.4 4.0 - 11.0 10e3/uL    RBC Count 3.97 (L) 4.40 - 5.90 10e6/uL    Hemoglobin 9.5 (L) 13.3 - 17.7 g/dL    Hematocrit 30.3 (L) 40.0 - 53.0 %    MCV 76 (L) 78 - 100 fL    MCH 23.9 (L) 26.5 - 33.0 pg    MCHC 31.4 (L) 31.5 - 36.5 g/dL    RDW 16.3 (H) 10.0 - 15.0 %    Platelet Count 298 150 - 450 10e3/uL   Basic metabolic panel   Result Value Ref Range    Sodium 135 135 - 145 mmol/L    Potassium 4.2 3.4 - 5.3 mmol/L    Chloride 103 98 - 107 mmol/L    Carbon Dioxide (CO2) 20 (L) 22 - 29 mmol/L    Anion Gap 12 7 - 15 mmol/L    Urea Nitrogen 14.8 8.0 - 23.0 mg/dL    Creatinine 1.01 0.67 - 1.17 mg/dL    GFR Estimate 72 >60 mL/min/1.73m2    Calcium 8.4 (L) 8.8 - 10.4 mg/dL    Glucose 111 (H) 70 - 99 mg/dL   INR   Result Value Ref Range    INR 1.20 (H) 0.85 - 1.15   Partial thromboplastin time   Result Value Ref Range    aPTT 31 22 - 38 Seconds   TSH   Result Value Ref Range    TSH 1.04 0.30 - 4.20 uIU/mL   Magnesium   Result Value Ref Range    Magnesium 1.6 (L) 1.7 - 2.3 mg/dL   Phosphorus   Result Value Ref Range    Phosphorus 4.0 2.5 - 4.5 mg/dL       All relevant imaging and laboratory values personally reviewed.

## 2024-09-21 PROBLEM — Z51.5 HOSPICE CARE: Status: ACTIVE | Noted: 2024-01-01

## 2024-09-21 NOTE — PROGRESS NOTES
Neuro Interventional progress note      24 hours:  MRI showing left MCA territory infarct with cortical involvement        HPI:    Homer Joseph is a 89 year old male with vascular risk factors of HTN, HLD, DM who presented with sudden onset right sided weakness and global aphasia. LKN estimated to be an hour prior to ED presentation. 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 TNK has been given at OSH. He underwent 2 passes mechanical thrombectomy with TICI 3 recanalization.    His exam this morning while still being intubated off sedation opening eyes spontaneously to voice with no tracking, has cough and gag reflexes present. His Left sided strength against gravity at baseline. Flaccid paralyiss of RUE and RLE. NIHSS of 16.  Groin site supple with no hematoma  NIHSS  Interval    1a. Level of Consciousness 1-->Not alert, but arousable by minor stimulation to obey, answer, or respond   1b. LOC Questions 2-->Answers neither question correctly   1c. LOC Commands 2-->Performs neither task correctly   2.   Best Gaze 0-->Normal   3.   Visual 0-->No visual loss (REFUGIO)   4.   Facial Palsy 0-->Normal symmetrical movements (Intubated, REFUGIO)   5a. Motor Arm, Left 2-->Some effort against gravity, limb cannot get to or maintain (if cued) 90 (or 45) degrees, drifts down to bed, but has some effort against gravity   5b. Motor Arm, Right 4-->No movement   6a. Motor Leg, Left 2-->Some effort against gravity, leg falls to bed by 5 secs, but has some effort against gravity   6b. Motor Leg, right 3-->No effort against gravity, leg falls to bed immediately   7.   Limb Ataxia 0-->Absent   8.   Sensory 0-->Normal, no sensory loss   9.   Best Language 0-->No aphasia, normal (Intubated, REFUGIO)   10. Dysarthria (UN) Intubated or other physical barrier   11. Extinction and Inattention  0-->No abnormality   Total 16 (09/19/24 1700)    Imaging Review:  MRI brain showing large left MCA territory infract with cortical  involvment      Plan:  Continue care per NCC stroke team recommendations        Gloria Sheppard MD   ESN Fellow  Pager: [phone]

## 2024-09-21 NOTE — CONSULTS
"[facility]  Palliative Care Consultation Note    Patient: Homer Joseph  Date of Admission:  9/19/2024    Requesting Clinician / Team: neuro ICU  Reason for consult: Symptom management  Patient and family support    Recommendations:  No specific new recs. His current 'comfort care' plan of care seems to be adequate; responding well to morphine 2 mg IV which he's received x2 today; as well as the Robinul. Would continue the current plan of care. If he ended up needing several morphine doses in a day may want to consider a long-acting med if it's felt like he may yet live for a few days, eg transdermal fentanyl.   My understanding is that he has been assessed by hospice for possible [facility] hospice here; that certainly seems reasonable for him if hospice/family/team agree. We'll follow him if he does not enroll in [facility] hospice here.   D/w dtr at bedside about prognosis--it is likely quite short and I rec'd she prepare herself emotionally that he could die in the next day; but it is conceivable he survives/lingers for many days/~~up to a week. Sounds like his family is well informed about this possibility and some relatives are planning on visiting. Patient's 8 and 10 yo daughters visited this morning and were able to see him.    has visited      Thank you for the opportunity to participate in the care of this patient and family. Our team: will continue to follow.   During regular M-F work hours -- if you are not sure who specifically to contact -- please text the \"Palliative Care [facility]\" voice group in [employer]. Our after-hours answering service is [phone]. Who's on-call for us in available in [facility], also.     Kev Astudillo MD / Palliative Medicine / Text me via [employer]      Assessments:  Homer Joseph is a 89 year old male with acute L MCA stroke s/p tenecteplace and thrombectomy; occurring in the context of prior strokes; CKD; dysphagia and recent G tube " dependence.   Transitioned to 'comfort only care' AM of 9/21/24 due to poor underlying qol and poor overall prognosis.     Today, the patient was seen for:  Acute L MCA stroke    Prognosis, Goals, & Planning:    Functional Status just prior to hospitalization: 3 (Capable of only limited self-care; needs help with ADLs; in bed/chair >50% of waking hours)    Prognosis, Goals, and/or Advance Care Planning were addressed today: Yes        Summary/Comments:     Patient's decision making preferences: unable to assess        Patient has decision-making capacity today for complex decisions: No          I have concerns about the patient/family's health literacy today: No         Patient has a completed Health Care Directive: No.     Code status: No CPR / No Intubation    Coping, Meaning, & Spirituality:   Mood, coping, and/or meaning in the context of serious illness were addressed today: Yes  Summary/Comments:     Social:   Living situation: lives with wife. Daughter and grandkids in [place]. Many stepchildren also in his life.     History of Present Illness:  History gathered today from: family/loved ones, medical chart, medical team members, unit team members    Presentation as above.  Transition to CMO this morning  Some agitation, wet secretions/noisy breathing, some labored resps  All seem to be responding well to morphine, Robinul.  Discussed prognosis, approach to comfort care; possible hospice as above.   It looks like hospice has eval'd him; his admission has not transitioned to [facility] hospice yet.     Patient unresponsive       Medications:  I have reviewed this patient's medication profile and medications from this hospitalization.   Noted:  APAP 650mg  Gabap 200 mg qam    Morphine 2 mg IV q15 min prn x1 so far today  Robinul 0.2 mg iv q4h prn; x1 this am      Physical Exam:  Vital Signs: Temp: 99.5  F (37.5  C) Temp src: Axillary BP: 136/58 Pulse: 86   Resp: 17 SpO2: 93 % O2 Device: Nasal cannula Oxygen Delivery:  2 LPM  Weight: 175 lbs 14.83 oz   Minimally responsive; does make eye contact briefly with me  Cheyne-Velasco respirations  Wet cough, but no noisy resps  Overall comfortable appearing    Data reviewed:  Recent imaging reviewed, my comments on pertinents:   MRI images personally reviewed; large L MCA stroke    Recent lab data reviewed, my comments on pertinents:   Cr 0.9  Plt 296

## 2024-09-21 NOTE — PLAN OF CARE
ICU End of Shift Summary. See flowsheets for vital signs and detailed assessment.    Changes this shift: Patient opening eyes and moving left side extremities. BP monitored and PRN medication provided to maintain systolic <180. Patient has moderate cough, however unable to effectively manage secretions. No desating noted. Sitter at bedside for decreased safety awareness, puling lines and attempting to get out of bed.     Neuro Crit updated on patient's increased secretions and coarse lung sounds.     Plan: Continue plan of care.       Goal Outcome Evaluation:      Plan of Care Reviewed With: patient    Overall Patient Progress: no changeOverall Patient Progress: no change    Outcome Evaluation: Patient sleeping between  cares.

## 2024-09-21 NOTE — CONSULTS
[facility]    Consult Note - [facility] Inpatient [facility]ospice  _________________________________________________________________    [facility] 24/7 Contact Number: [phone]    - Providers: Please contact [facility] with changes in orders or clinical plan of care   - Nursing: Please contact [facility] with significant changes in patient condition    Hospice will notify the care team (including the hospitalist) to confirm date of inpatient hospice (GIP) admission.    New Epic encounter will not be created until hospice completes admission.   ______________________________________________________________________        Hospice Diagnosis: CVA    Indication for Inpatient Hospice: secretions, dyspnea, agitation    Goals for Hospital Discharge: secretions managed AEB patient not having audible secretions for 48 hours, dyspnea managed AEB patient RR <20 for 48 hours, agitation managed AEB patient not pulling at lines for 48 hours     Plan of Care Discussed with the Following:   - Nurse: BRITTON Rivero  - Hospitalist/Rounding Provider: Dr. Brady Coronel's Family/Preferred Contact: wifeElsa  - Hospice Provider: Dr. Lion    Summary of Visit (includes assessment, medications and any new orders):   Met with patient and family.  arrived and said a prayer with family. Educated on hsopice service and medications. Consents signed and all questions answered to family's satisfaction. Discussed turning off monitors, family will discuss.     New orders:  Robinul 0.4mg Q4hrs IV scheduled  Lorazepam 1mg Q6 hours IV scheduled   Morphine 2mg Q4 hours IV scheduled      Lisbet Castlilo RN

## 2024-09-21 NOTE — PLAN OF CARE
ICU End of Shift Summary. See flowsheets for vital signs and detailed assessment.    Changes this shift: Head MRI completed today - family decision to take pt home w/ hospice. Code status updated multiple times, multiple family members at bedside & interacting with patient. Not following commands, moving left side only - no RUE movement w/ RLE flexing. Requiring frequent redirection to prevent getting OOB, throwing legs out, pulling tubes/lines - sitter @ bedside starting 1900. SR, sys goal increased to <180 - PRNs given multiple times to meet & PTA meds started. Extubated around 1400, weaned to RA. Secretions orally manageable with suction - pt less cooperative with yoanuel following ET removal. No BM, OG pulled, NJ placed, TF initiated. Mag replaced this AM.    Plan: Family desire to discharge home w/ hospice -  following.

## 2024-09-21 NOTE — PROGRESS NOTES
Care Management Follow Up    Length of Stay (days): 2    Expected Discharge Date: 09/23/2024     Concerns to be Addressed: grief and loss, discharge planning, coping/stress, other (see comments) (Goals of Care, Hospice)       Patient plan of care discussed at interdisciplinary rounds: Yes    Anticipated Discharge Disposition:  (home hospice vs residential hospice)     Anticipated Discharge Services: Possible home hospice and transportation coordination-ongoing.    Anticipated Discharge DME: If plan is home with hospice-agency to coordinate and provide.    Patient/family educated on Medicare website which has current facility and service quality ratings: yes    Education Provided on the Discharge Plan: Yes    Patient/Family in Agreement with the Plan:  Yes    Previous Referrals Placed by CM/SW:    [facility]  PH: [phone]  F: [phone]  Of Note: Pt was accepted for hospice services. Currently referral is still open pending pt/family's decision.    Private pay costs discussed: Not applicable at this time.    Discussed  Partnership in Safe Discharge Planning  document with patient/family: No     Handoff Completed: No, handoff not indicated or clinically appropriate at this time.    Additional Information:  @0900AM SW connected with Neuro-Crit Team to learn more about discharge plans as the plan had previously been for home with hospice services. CSW informed that pts medical status has changed-pt is declining and presenting more uncomfortable. Team shared that pt has been transitioned to comfort cares this morning and that family is agreeable.  SW informed that the medical team has placed a Palliative Consult and GIP consult. Team confirmed that home with hospice services will not be an appropriate option at this time.     CAITLIN met with pts family (daughter, JORGE A, and 2 grand-daughters) at bedside, introduced self, and explained role in pt's care. Pt heavily asleep at time of visit. Daughter confirmed that  pt has transitioned to comfort cares and that home with hospice will not be the discharge plan at this time. Family expressed gratitude for social work support. CSW encouraged family to reach out with any questions/concerns.  Currently, they did not indicate any questions/concerns at this time.     CAITLIN called [facility] and spoke with Staff Janessa ([phone]).  CAITLIN notified her of change in pts medical status and change in discharge plans. Janessa to deactivate pts referral and notify appropriate staff.      ADDENDUM: CAITLIN notified by [facility] RN Staff that upon her visit that pt was more alert and oriented.  She asked this writer to provide resources to the family with a list of inpatient hospice facilities and information about [facility].  CAITLIN met with pts daughter Jeri to discuss hospice plans.  Jeri shared that she has not had a recent update from the medical team about current prognosis-since this morning.  Jeri shared that she hopes to learn more about pts prognosis in order to make decisions about next steps.  Jeri shared that her first preference would be for pt to discharge home on hospice with hospice services and possible private duty nursing support. However, this would be considered if the pts level of alertness and orientation was to a degree in which he would appreciate/acknowledge that he was home.  Otherwise, Jeri shared that the other preference would be to make a referral to [facility].  J[facility] and that a referral would need to be made on Monday by the weekday CAITLIN.  [facility] and provided a private duty nursing agency list.  Jeri shared that she was previously provided a list of residential hospice facilities and would not need another one.      If a decision has been made that pt is medically stable to go home on hospice, Jeri is strongly  considering choosing [facility] and would like to keep the referral open. Jeri shared that staff from [facility] contacted her yesterday.  Therefore, SW called [facility] on 2024 to inform them that family would like to keep the referral open.  Staff Merari re-activated referral and requests the weekday SW to contact them next week when discharge plans are finalized.  WKND SW partially completed a [facility] application form and placed it in pts paper chart.  WKND SW will defer to weekday  to follow up on [facility] referral and ongoing discharge planning.    ADDENDUM: On 2024, approximately at @1445PM, Pt transitioned to inpatient hospice (GIP).    ADDENDUM: On 2024, patient is .  SW called [facility] @0800AM on 2024. Informed staff that patient has passed and to request referral to be closed. Staff will notify intake.      Next Steps: None-patient passed on 2024.     FARIDA Feliciano, [facility]    Office: [phone]  [email or web address]   Social Work and Care Management Department       SEARCHABLE in [employer] - search SOCIAL WORK       [place] (0800 - 1630) Saturday and      Units: 4A Vocera, 4C Vocera, & 4E Vocera        Units: 5A 6044-7882 Vocera, 5A 1402-5910 Vocera , BMT SW 1 BMT SW 2, BMT SW 3 & BMT SW 4  5C Off Service 5401 - 5416  5C Off Service [id number]     Units: 6A Vocera & 6B Vocera      Units: 6C Vocera     Units: 7A Vocera & 7B Vocera      Units: 7C Med Surg 7401 thru 7418 and 7C Med Surg 7502 thru 7521      Unit: [place] ED Vocera & [place] Obs Vocera     [facility] (1476-0558) Saturday and       Units: 5 Ortho Vocera, 5 Med Surg Vocera & WB ED Vocera     Units: 6 Med Surg Vocera, 8 Med Surg Vocera, & 10 ICU Vocera      After hours Vocera [facility] and After Hours Vocera [place]     Saturday &  (1630 - 0000)    Mon-Fri ([phone])     [facility] Recognized Holidays  ([id number])    Units: ALL    - see above VOCERA links to units and after hours

## 2024-09-21 NOTE — H&P
[facility]    History and Physical - Hospitalist Service, GOLD TEAM        Date of Admission:  9/21/2024    Assessment & Plan   Homer Joseph is a 89 year old male who is being transitioned to inpatient hospice on 9/21/2024. Please see the discharge summary from the same date for more details of his hospital course.  Homer Joseph is a 89 year old male who is being transitioned to inpatient hospice on (Not on file).Patient was transferred to [facility] on 9/19/2024 for thrombectomy. DSA revealed a left M1 occlusion and it was treated with a total of 2 passes with aspiration only for 1 pass and 1 pass with stent retriever and aspiration with TICI 3 recanalization. 9/20 MRI brain wwo contrast revealed  infarct in the left M1 MCA territory. Based on image findings, patient family decided to compassionately extubate patient. And transition to comfort measures.     Please see the discharge summary from the same date for more details of his hospital course. GIP following.      Pain  - IV or PO ativan 1 mg q6h  - IV morphine 2 mg q4h   - Tylenol suppository 650 mg rectal q4h PRN      Air hunger  - IV or PO ativan 1 mg q6h  - IV morphine 2 mg q4h      Secretions  - Glycopyrrolate 0.4 mg q4h  - Atropine drops SL q4h PRN     Bowel regimen  - Senokot 1 tab BID PRN   - Bisacodyl 10 mg once PRN              Diet:  Comfort cares  Hazel Catheter: Not present  Lines: None     Code Status: No CPR- Do NOT Intubate\  Expected Discharge: working on discharge with hospice    Radha Singh PA-C  Hospitalist Service, GOLD TEAM   [facility]  Securely message with [employer] (more info)  Text page via [facility] Paging/Directory   See signed in provider for up to date coverage information    ______________________________________________________________________    Chief Complaint   Transitioning to inpatient hospice    History of Present Illness    Homer Joseph is a 89 year old male who is being transitioned to inpatient hospice.  Please see the discharge summary from the same date for more details; a brief summary of his current symptoms is included here.     Daughter at bedside. Thinks he seems comfortable. Noticing changes in his respirations.     No signs of air hunger. Appears comfortable.        Physical Exam   Vital Signs:                    Weight: 0 lbs 0 oz    Physical exam deferred given current goals of care    Medical Decision Making       40 MINUTES SPENT BY ME on the date of service doing chart review, history, exam, documentation & further activities per the note.

## 2024-09-21 NOTE — CONSULTS
SPIRITUAL HEALTH SERVICES  SPIRITUAL ASSESSMENT Consult Note  [facility] ([place] 4E     REFERRAL SOURCE: On Call     Visited with pt. Homer, wife Elsa and daughter Nicole per on call request from Nicole for a . Homer had been moved to comfort cares and was struggling to breathe.    They shared about how he had a stroke and it all happened so quickly, but that it was good he was with his friends playing cards and while Nicole was still home.      Nicole said how grateful she was to the doctor who told her to come in rather than go on her planned trip.    Elsa is Religious and Nicole is a non-Gnosticist Yazidism and they asked for a blessing for Homer, which I offered. Elsa expressed how much she loved the blessing, and I gave her a copy.    PLAN: No need for follow up. [facility] remains available for bereavement care if needed.    KEVIN Suarez  Chaplain Resident

## 2024-09-21 NOTE — PROGRESS NOTES
"Neurocritical Care Progress Note    Reason for critical care admission: Acute Ischemic Stroke  Admitting Team: DTA  Date of Service:  09/21/2024  Date of Admission:  9/19/2024  Hospital Day: 3    Assessment/Plan  Homer Joseph is a 89yo with a PMH significant for recent stroke (4/15/2024 R hemispheric 2/2 right ICA 90% stenosis s/p right CEA), DM, dysphagia (had PEG tube until 3 weeks ago), HLD, gout, CKD. He was playing cards with friends at 11:10 am when he suddenly \"slumped over.\" Reportedly prior to that time he was asymptomatic. On initial ED exam - R-facial weakness, R-sided weakness, and L-gaze preference. CTH negative for hemorrhage.  CTA H&N revealed L M1 occlusion. BP immediately prior to administration of TNK was 169/80 and 158/70. TNK was administered at 12:41 PM. Presenting BP was 234/110. Patient was transferred to [facility] on 9/19/2024 for thrombectomy. DSA revealed a left M1 occlusion and it was treated with a total of 2 passes with aspiration only for 1 pass and 1 pass with stent retriever and aspiration with TICI 3 recanalization.     24 hour events:    - Tmax 100.5. Family expressed interest in outpatient hospice.  Code status changed to DNR/DNI. Sitter at bedside d/t increased agitation and restless overnight. Transitioned to comfort cares this morning.     Neuro  #Left MCA (M1 occlusion) Ischemic Stroke, ESUS  #S/p mechanical thrombectomy with TICI 3 recanalization   #Prior Stroke 4/15/2024 s/p MUKUND CEA  -Started yesterday, ASA 81 mg daily- discontinued  -Transitioning to comfort measures  -Comfort care order set in place.  -Inpatient Hospice order in place.  -Palliative consult in place  -Spiritual Health consult in place    #Analgesics & Sedation  -PRN Tylenol 650 mg q 4h  -PRN IV Morphine 1-2 mg q 15 mins  -PRN IV ativan  mg q 3h    CV  #HTN  #HLD  #Sinus Tachycardia  - Discontinuing all cardiac monitoring and medications. Patient transitioning to comfort cares.     Resp  #Ineffective " secretions clearance  #Aspiration, Suspected  -Comfort care order set in place.   -PRN Robinul 0.2 mg q 4h  -PRN Atropine sublingual gtts for oral secretions    GI  #DAWSON  -Stop TF  -Removing small bore feeding tube    Renal/  #CKD, Stage 2  #Gout  -Primo fit in place    Endo  # DM, type 2  #Hyperglycemia  -Discontinuing glucose monitoring.    Heme  #Microcytic Anemia likely 2/2 chronic disease  #Drug induced platelet defect  #Coagulation defect   -Discontinuing all labs    ICU Checklist  Lines/tubes/drains: PIV, remove small bore feeding tube  FEN:NPO  PPX: DVT - SCDs ;  Code: DNR/DNI  Dispo: Transitioning to comfort care      TIME SPENT ON THIS ENCOUNTER   I spent 40 minutes of time on the unit/floor managing the care of Homer Joseph excluding time performing procedures. Upon evaluation, this patient had a high probability of imminent or life-threatening deterioration due to iscehmic stroke, which required my direct attention, intervention, and personal management. Greater than 50% of my time was spent at the bedside counseling the patient and/or coordinating care including chart review, history, exam, documentation, and further activities per this note. I have personally reviewed the following data/imaging over the past 24 hours.     The patient was seen and discussed with the NCC attending, Dr. Sterling Abreu.    Noemy TAY, CNP  Neuro Critical Care Nurse Practitioner  Ascom: #[phone]    24 Hour Vital Signs Summary:  Temp: 100  F (37.8  C) Temp  Min: 99.2  F (37.3  C)  Max: 100.5  F (38.1  C)  Resp: 21 Resp  Min: 13  Max: 29  SpO2: 93 % SpO2  Min: 91 %  Max: 100 %  Pulse: 105 Pulse  Min: 59  Max: 109    No data recorded  BP: (!) 185/143 Systolic (24hrs), Av , Min:96 , Max:207   Diastolic (24hrs), Av, Min:47, Max:143      Respiratory monitoring:   FiO2 (%): 30 %, Resp: 21, Vent Mode: (S) CPAP/PS, Resp Rate (Set): 14 breaths/min, Tidal Volume (Set, mL): 400 mL, PEEP (cm H2O): 5 cmH2O,  Pressure Support (cm H2O): 5 cmH2O, Resp Rate (Set): 14 breaths/min, Tidal Volume (Set, mL): 400 mL, PEEP (cm H2O): 5 cmH2O    I/O last 3 completed shifts:  In: 1031.63 [I.V.:506.63; NG/GT:315]  Out: 1830 [Urine:1830]    Current Medications:  Current Facility-Administered Medications   Medication Dose Route Frequency Provider Last Rate Last Admin    aspirin (ASA) chewable tablet 81 mg  81 mg Oral or Feeding Tube Daily Ritika Smart CNP   81 mg at 09/21/24 0738    gabapentin (NEURONTIN) capsule 200 mg  200 mg Oral or Feeding Tube BID Ritika Smart CNP   200 mg at 09/21/24 0738    sodium chloride (PF) 0.9% PF flush 3 mL  3 mL Intravenous Q8H Noemy Mo CNP           PRN Medications:  Current Facility-Administered Medications   Medication Dose Route Frequency Provider Last Rate Last Admin    acetaminophen (TYLENOL) tablet 650 mg  650 mg Oral or Feeding Tube Q4H PRN Ritika Smart CNP   650 mg at 09/21/24 0738    atropine 1 % ophthalmic solution 2 drop  2 drop Sublingual Q4H PRN Noemy Mo CNP   2 drop at 09/21/24 0905    [START ON 9/24/2024] bisacodyl (DULCOLAX) suppository 10 mg  10 mg Rectal Once PRN Noemy Mo CNP        carboxymethylcellulose PF (REFRESH PLUS) 0.5 % ophthalmic solution 1-2 drop  1-2 drop Both Eyes Q1H PRN Noemy Mo CNP        glycopyrrolate (ROBINUL) injection 0.2 mg  0.2 mg Intravenous Q4H PRN Noemy Mo CNP        glycopyrrolate (ROBINUL) tablet 2 mg  2 mg Oral or Feeding Tube Q4H PRN Noemy Mo CNP        lidocaine (LMX4) cream   Topical Once PRN Noemy Mo CNP        lidocaine 1 % 1 mL  1 mL Other Once PRN Noemy Mo CNP        LORazepam (ATIVAN) injection 1 mg  1 mg Intravenous Q3H PRN Noemy Mo CNP        Or    LORazepam (ATIVAN) tablet 1 mg  1 mg Sublingual Q3H PRN Noemy Mo, CNP        mineral oil-hydrophilic petrolatum (AQUAPHOR)   Topical Q1H PRN  "Noemy Mo CNP        morphine (PF) injection 1 mg  1 mg Intravenous Q15 Min PRN Noemy Mo CNP        morphine (PF) injection 2 mg  2 mg Intravenous Q15 Min PRN Noemy Mo CNP        naloxone (NARCAN) injection 0.1 mg  0.1 mg Intravenous Q2 Min PRN Noemy Mo CNP        naloxone (NARCAN) injection 0.2 mg  0.2 mg Intravenous Q2 Min PRN Noemy Mo CNP        sennosides (SENOKOT) tablet 1 tablet  1 tablet Oral or Feeding Tube BID PRN Noemy Mo CNP        sodium chloride (PF) 0.9% PF flush 3 mL  3 mL Intravenous Q1H PRN Noemy Mo CNP        sodium chloride (PF) 0.9% PF flush 3 mL  3 mL Intracatheter q1 min prn Ritika Smart CNP           Infusions:  Current Facility-Administered Medications   Medication Dose Route Frequency Provider Last Rate Last Admin    Medication Instruction - Avoid dextrose in IV solutions   Intravenous Continuous PRN Ritika Smart CNP           Allergies   Allergen Reactions    Sulfa (Sulfonamide Antibiotics) [Sulfa Antibiotics] Hives     And itching in hands and feet       Physical Examination:  Vitals: BP (!) 185/143   Pulse 105   Temp 100  F (37.8  C) (Axillary)   Resp 21   Ht 1.778 m (5' 10\")   Wt 79.8 kg (175 lb 14.8 oz)   SpO2 93%   BMI 25.24 kg/m    General: Adult male patient, lying in bed  HEENT: Normocephalic, atraumatic, no icterus, oral cavity/oropharynx pink and moist  Cardiac: Sinus Tachycardia via bedside cardiac monitor/  Pulm: Moderate amount of secretions noted. Tachypneic.   Abdomen: Soft, non-distended, bowel sounds present  Extremities: Warm, no edema, distal pulses +2, well perfused  Skin: No rash or lesion  Psych: Restless  Neuro:  Mental status: Awakens to voice. Not following commands.   Cranial nerves: Left gaze preference. PERRL.  Motor: LUE/LLE moves spontaneously. RUE no response to movement. RLE triple flex,   Sensory: Intact to light touch x 4 " extremities  Coordination: REFUGIO, deferred  Gait: REFUGIO, deferred.        Labs and Imaging:      All relevant imaging and laboratory values personally reviewed.

## 2024-09-21 NOTE — DISCHARGE SUMMARY
"[facility]  NEUROLOGY DISCHARGE SUMMARY    Patient Name:  Homer Joseph  MRN:  [id number]      :  1935      Date of Admission:  2024  Date of Discharge:  2024  Admitting Physician:  Sterling Abreu MD  Discharge Physician:    Primary Care Provider:   Zafar Bravo  Discharge Disposition:   Admited to hospice care.   Agency: [facility].  Discharged to home    Admission Diagnoses:   Acute L MCA stroke s/p TNK and thrombectomy; history of  prior strokes; CKD; dysphagia and recent removal G tube.     Discharge Diagnoses:     Acute L MCA stroke s/p tenecteplace and thrombectomy    Brief History of Illness:   Homer Joseph is a 89yo with a PMH significant for recent stroke (4/15/2024 R hemispheric 2/2 right ICA 90% stenosis s/p right CEA), DM, dysphagia (had PEG tube until 3 weeks ago), HLD, gout, CKD. He was playing cards with friends at 11:10 am when he suddenly \"slumped over.\" Reportedly prior to that time he was asymptomatic. On initial ED exam - R-facial weakness, R-sided weakness, and L-gaze preference. CTH negative for hemorrhage.  CTA H&N revealed L M1 occlusion. BP immediately prior to administration of TNK was 169/80 and 158/70. TNK was administered at 12:41 PM. Presenting BP was 234/110. Patient was transferred to [facility] on 2024 for thrombectomy. DSA revealed a left M1 occlusion and it was treated with a total of 2 passes with aspiration only for 1 pass and 1 pass with stent retriever and aspiration with TICI 3 recanalization.     Hospital Course:  Patient was transferred to [facility] on 2024 for thrombectomy. DSA revealed a left M1 occlusion and it was treated with a total of 2 passes with aspiration only for 1 pass and 1 pass with stent retriever and aspiration with TICI 3 recanalization.  MRI brain wwo contrast revealed  infarct in the left M1 MCA territory. Based on image findings, patient family decided to compassionately extubate " "patient. And transition to comfort measures.    Pertinent Investigations:  S/P TNK  S/p Thrombectomy      Consultations:    [facility]  Spiritual Health   Palliative Care    Recommendations and Follow-up:  Transition to comfort care/ inpatient hospice.    Discharge physical examination:   /58   Pulse 86   Temp 99.5  F (37.5  C) (Axillary)   Resp 17   Ht 1.778 m (5' 10\")   Wt 79.8 kg (175 lb 14.8 oz)   SpO2 93%   BMI 25.24 kg/m    General: Adult male patient, lying in bed  HEENT: Normocephalic, atraumatic, no icterus, oral cavity/oropharynx pink and moist  Cardiac: Sinus Tachycardia via bedside cardiac monitor/  Pulm: Moderate amount of secretions noted. Tachypneic.   Abdomen: Soft, non-distended, bowel sounds present  Extremities: Warm, no edema, distal pulses +2, well perfused  Skin: No rash or lesion  Psych: Restless  Neuro:  Mental status: Awakens to voice. Not following commands.   Cranial nerves: Left gaze preference. PERRL.  Motor: LUE/LLE moves spontaneously. RUE no response to movement. RLE triple flex,   Sensory: Intact to light touch x 4 extremities  Coordination: REFUGIO, deferred  Gait: REFUGIO, deferred.    Discharge Medications:  Current Discharge Medication List        CONTINUE these medications which have NOT CHANGED    Details   allopurinol (ZYLOPRIM) 100 MG tablet [ALLOPURINOL (ZYLOPRIM) 100 MG TABLET] Take 100 mg by mouth daily.      aspirin (ASA) 325 MG EC tablet Take 325 mg by mouth daily.      atorvastatin (LIPITOR) 40 MG tablet Take 40 mg by mouth daily.      gabapentin (NEURONTIN) 100 MG capsule Take 200 mg by mouth 2 times daily.      insulin glargine (LANTUS) 100 unit/mL injection Inject 30 Units subcutaneously every morning.      insulin lispro (HUMALOG KWIKPEN) 100 UNIT/ML (1 unit dial) KWIKPEN Inject 0-6 Units subcutaneously at bedtime. For blood glucose: 220-259= +2 units; 260-299= +3 units; 300-339= +4 units; 340-379= +5 units; 380-399= +6 units      lisinopril (ZESTRIL) 20 MG " tablet Take 20 mg by mouth daily.      loperamide (IMODIUM) 2 MG capsule Take 2 mg by mouth daily as needed for diarrhea.      melatonin 5 MG tablet Take 5 mg by mouth nightly as needed for sleep. May take an additional tablet if needed      metFORMIN (GLUCOPHAGE XR) 500 MG 24 hr tablet Take 500 mg by mouth 2 times daily (with meals).      omeprazole (PRILOSEC) 20 MG capsule [OMEPRAZOLE (PRILOSEC) 20 MG CAPSULE] Take 20 mg by mouth daily.      tamsulosin (FLOMAX) 0.4 MG capsule Take 0.4 mg by mouth daily.             Discharge follow up and instructions:  No discharge procedures on file.    Patient seen and discussed with Dr. Sterling Abreu.  Noemy TAY, CNP  Neuro Critical Care Nurse Practitioner  Ascom: #[phone]

## 2024-09-21 NOTE — PLAN OF CARE
Major Shift Events:  Pt transitioned to comfort cares this morning. Scheduled meds for comfort. Repositioned q2h. 2L NC. NJ removed and TF stopped. Hazel in place with adequate UOP. PIV x2.Team ok'd home dexcon.     Plan: Continue with comfort cares. Dischare with hospice? Notify team of any changes/concerns.     For vital signs and complete assessments, please see documentation flowsheets.

## 2024-09-22 NOTE — H&P
MD DEATH PRONOUNCEMENT    Called to pronounce Homer Joseph dead.    Physical Exam: Unresponsive to noxious stimuli, Spontaneous respirations absent, Breath sounds absent, Carotid pulse absent, Heart sounds absent, Pupillary light reflex absent, and Corneal blink reflex absent    Patient was pronounced dead at 05:20 AM, 2024.    CVA    Active Problems:    Hospice care       Infectious disease present?: NO    Communicable disease present? (examples: HIV, chicken pox, TB, Ebola, CJD) :  NO    Multi-drug resistant organism present? (example: MRSA): NO    Please consider an autopsy if any of the following exist:  NO Unexpected or unexplained death during or following any dental, medical, or surgical diagnostic treatment procedures.   NO Death of mother at or up to seven days after delivery.     NO All  and pediatric deaths.     NO Death where the cause is sufficiently obscure to delay completion of the death certificate.   NO Deaths in which autopsy would confirm a suspected illness/condition that would affect surviving family members or recipients of transplanted organs.     The following deaths must be reported to the 's Office:  NO A death that may be due entirely or in part to any factors other than natural disease (recent surgery, recent trauma, suspected abuse/neglect).   NO A death that may be an accident, suicide, or homicide.     NO Any sudden, unexpected death in which there is no prior history of significant heart disease or any other condition associated with sudden death.   NO A death under suspicious, unusual, or unexpected circumstances.    NO Any death which is apparently due to natural causes but in which the  does not have a personal physician familiar with the patient s medical history, social, or environmental situation or the circumstances of the terminal event.   NO Any death apparently due to Sudden Infant Death Syndrome.     NO Deaths that occur during, in  association with, or as consequences of a diagnostic, therapeutic, or anesthetic procedure.   NO Any death in which a fracture of a major bone has occurred within the past (6) six months.   NO A death of persons note seen by their physician within 120 days of demise.     NO Any death in which the  was an inmate of a public institution or was in the custody of Law Enforcement personnel.   NO  All unexpected deaths of children   NO Solid organ donors   NO Unidentified bodies   NO Deaths of persons whose bodies are to be cremated or otherwise disposed of so that the bodies will later be unavailable for examination;   NO Deaths unattended by a physician outside of a licensed healthcare facility or licensed residential hospice program   NO Deaths occurring within 24 hours of arrival to a health care facility if death is unexpected.    NO Deaths associated with the decedent s employment.   NO Deaths attributed to acts of terrorism.   NO Any death in which there is uncertainty as to whether it is a medical examiner s care should be discussed with the medical investigator.        Body disposition: Rudy

## 2024-09-22 NOTE — PLAN OF CARE
Shift Summary. See flowsheets for vital signs and detailed assessment.    Changes this shift: Patient having increased secretions and air hunger. MD updated. PRN medication provide for end of life care and comfort. Family called and at bed side at time of patient passing.  Hospice called and updated with time of death.

## 2024-09-25 LAB — BACTERIA BLD CULT: NO GROWTH

## 2024-09-30 LAB
ATRIAL RATE - MUSE: 90 BPM
DIASTOLIC BLOOD PRESSURE - MUSE: NORMAL MMHG
INTERPRETATION ECG - MUSE: NORMAL
P AXIS - MUSE: 67 DEGREES
PR INTERVAL - MUSE: 188 MS
QRS DURATION - MUSE: 124 MS
QT - MUSE: 366 MS
QTC - MUSE: 447 MS
R AXIS - MUSE: 28 DEGREES
SYSTOLIC BLOOD PRESSURE - MUSE: NORMAL MMHG
T AXIS - MUSE: 56 DEGREES
VENTRICULAR RATE- MUSE: 90 BPM

## 2024-10-18 NOTE — DISCHARGE SUMMARY
[facility]    Death Summary - Hospitalist Service, GOLD TEAM      Date of Admission:  9/21/2024  Date of Death: 9/22/2024  5:20 AM  Discharging Provider: Marisela Aguilar MD    Discharge Diagnoses   Homer Joseph is a 89 year old male transitioned to inpatient hospice on 9/21/2024. Homer was transferred to [facility] on 9/19/2024 for thrombectomy. DSA revealed a left M1 occlusion and it was treated with a total of 2 passes with aspiration only for 1 pass and 1 pass with stent retriever and aspiration with TICI 3 recanalization. 9/20 MRI brain wwo contrast revealed  infarct in the left M1 MCA territory. Based on image findings, patient family decided to compassionately extubate patient. And transition to comfort measures.      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 Please see the discharge summary from the same date for more details of his hospital course.  Patient was transferred to [facility] on 9/19/2024 for thrombectomy. DSA revealed a left M1 occlusion and it was treated with a total of 2 passes with aspiration only for 1 pass and 1 pass with stent retriever and aspiration with TICI 3 recanalization. 9/20 MRI brain wwo contrast revealed  infarct in the left M1 MCA territory. Based on image findings, patient family decided to compassionately extubate patient. And transition to comfort measures.  Patient was unresponsive to noxious stimuli, Spontaneous respirations absent, Breath sounds absent, Carotid pulse absent, Heart sounds absent, Pupillary light reflex absent, and Corneal blink reflex absent. Patient was pronounced dead at 05:20 AM, September 22, 2024.      Marisela Aguilar MD  [facility]  ______________________________________________________________________      Significant Results and  Procedures       Consultations This Hospital Stay   CARE MANAGEMENT / SOCIAL WORK IP CONSULT    Primary Care Physician   Zafar Bravo    Time Spent on this Encounter   I, Marisela Aguilar MD, personally saw the patient today and spent greater than 30 minutes discharging this patient.

## (undated) RX ORDER — LIDOCAINE HYDROCHLORIDE 10 MG/ML
INJECTION, SOLUTION EPIDURAL; INFILTRATION; INTRACAUDAL; PERINEURAL
Status: DISPENSED
Start: 2024-01-01

## (undated) RX ORDER — HEPARIN SODIUM 200 [USP'U]/100ML
INJECTION, SOLUTION INTRAVENOUS
Status: DISPENSED
Start: 2024-01-01

## (undated) RX ORDER — NOREPINEPHRINE BITARTRATE 0.06 MG/ML
INJECTION, SOLUTION INTRAVENOUS
Status: DISPENSED
Start: 2024-01-01

## (undated) RX ORDER — FENTANYL CITRATE 50 UG/ML
INJECTION, SOLUTION INTRAMUSCULAR; INTRAVENOUS
Status: DISPENSED
Start: 2024-01-01